# Patient Record
Sex: MALE | Race: BLACK OR AFRICAN AMERICAN | NOT HISPANIC OR LATINO | Employment: FULL TIME | URBAN - METROPOLITAN AREA
[De-identification: names, ages, dates, MRNs, and addresses within clinical notes are randomized per-mention and may not be internally consistent; named-entity substitution may affect disease eponyms.]

---

## 2018-09-09 ENCOUNTER — HOSPITAL ENCOUNTER (INPATIENT)
Facility: HOSPITAL | Age: 23
LOS: 2 days | Discharge: LEFT BEFORE DISCHARGE | DRG: 683 | End: 2018-09-11
Attending: EMERGENCY MEDICINE | Admitting: FAMILY MEDICINE
Payer: MEDICAID

## 2018-09-09 DIAGNOSIS — N17.9 ACUTE RENAL FAILURE, UNSPECIFIED ACUTE RENAL FAILURE TYPE: Primary | ICD-10-CM

## 2018-09-09 DIAGNOSIS — M62.82 NON-TRAUMATIC RHABDOMYOLYSIS: ICD-10-CM

## 2018-09-09 DIAGNOSIS — R01.1 MURMUR, CARDIAC: ICD-10-CM

## 2018-09-09 DIAGNOSIS — R11.10 VOMITING, INTRACTABILITY OF VOMITING NOT SPECIFIED, PRESENCE OF NAUSEA NOT SPECIFIED, UNSPECIFIED VOMITING TYPE: ICD-10-CM

## 2018-09-09 LAB
25(OH)D3+25(OH)D2 SERPL-MCNC: 29 NG/ML
ALBUMIN SERPL BCP-MCNC: 4.3 G/DL
ALP SERPL-CCNC: 70 U/L
ALT SERPL W/O P-5'-P-CCNC: 25 U/L
AMPHET+METHAMPHET UR QL: NEGATIVE
ANION GAP SERPL CALC-SCNC: 16 MMOL/L
AST SERPL-CCNC: 51 U/L
BACTERIA #/AREA URNS HPF: ABNORMAL /HPF
BARBITURATES UR QL SCN>200 NG/ML: NEGATIVE
BASOPHILS # BLD AUTO: 0.01 K/UL
BASOPHILS NFR BLD: 0.1 %
BENZODIAZ UR QL SCN>200 NG/ML: NEGATIVE
BILIRUB SERPL-MCNC: 1.2 MG/DL
BILIRUB UR QL STRIP: NEGATIVE
BUN SERPL-MCNC: 34 MG/DL
BZE UR QL SCN: NEGATIVE
CALCIUM SERPL-MCNC: 9.8 MG/DL
CANNABINOIDS UR QL SCN: NORMAL
CHLORIDE SERPL-SCNC: 100 MMOL/L
CK SERPL-CCNC: 2616 U/L
CLARITY UR: CLEAR
CO2 SERPL-SCNC: 24 MMOL/L
COLOR UR: YELLOW
CREAT SERPL-MCNC: 7.2 MG/DL
CREAT UR-MCNC: 170.6 MG/DL
CREAT UR-MCNC: 170.6 MG/DL
DIFFERENTIAL METHOD: ABNORMAL
EOSINOPHIL # BLD AUTO: 0 K/UL
EOSINOPHIL NFR BLD: 0.3 %
ERYTHROCYTE [DISTWIDTH] IN BLOOD BY AUTOMATED COUNT: 12.6 %
EST. GFR  (AFRICAN AMERICAN): 11 ML/MIN/1.73 M^2
EST. GFR  (NON AFRICAN AMERICAN): 10 ML/MIN/1.73 M^2
GLUCOSE SERPL-MCNC: 87 MG/DL
GLUCOSE UR QL STRIP: NEGATIVE
HCT VFR BLD AUTO: 43.9 %
HGB BLD-MCNC: 14.7 G/DL
HGB UR QL STRIP: ABNORMAL
HYALINE CASTS #/AREA URNS LPF: 0 /LPF
KETONES UR QL STRIP: NEGATIVE
LEUKOCYTE ESTERASE UR QL STRIP: ABNORMAL
LIPASE SERPL-CCNC: 48 U/L
LYMPHOCYTES # BLD AUTO: 0.9 K/UL
LYMPHOCYTES NFR BLD: 11.7 %
MCH RBC QN AUTO: 30.3 PG
MCHC RBC AUTO-ENTMCNC: 33.5 G/DL
MCV RBC AUTO: 91 FL
METHADONE UR QL SCN>300 NG/ML: NEGATIVE
MICROSCOPIC COMMENT: ABNORMAL
MONOCYTES # BLD AUTO: 1.3 K/UL
MONOCYTES NFR BLD: 17.5 %
NEUTROPHILS # BLD AUTO: 5.2 K/UL
NEUTROPHILS NFR BLD: 70.3 %
NITRITE UR QL STRIP: NEGATIVE
OPIATES UR QL SCN: NORMAL
PCP UR QL SCN>25 NG/ML: NEGATIVE
PH UR STRIP: 6 [PH] (ref 5–8)
PHOSPHATE SERPL-MCNC: 4.5 MG/DL
PLATELET # BLD AUTO: 240 K/UL
PMV BLD AUTO: 9.2 FL
POTASSIUM SERPL-SCNC: 3.4 MMOL/L
PROT SERPL-MCNC: 7.5 G/DL
PROT UR QL STRIP: ABNORMAL
PROT UR-MCNC: 125 MG/DL
PROT/CREAT UR: 0.73 MG/G{CREAT}
PTH-INTACT SERPL-MCNC: 48 PG/ML
RBC # BLD AUTO: 4.85 M/UL
RBC #/AREA URNS HPF: 5 /HPF (ref 0–4)
SODIUM SERPL-SCNC: 140 MMOL/L
SODIUM UR-SCNC: 36 MMOL/L
SP GR UR STRIP: 1.02 (ref 1–1.03)
TOXICOLOGY INFORMATION: NORMAL
URN SPEC COLLECT METH UR: ABNORMAL
UROBILINOGEN UR STRIP-ACNC: NEGATIVE EU/DL
UUN UR-MCNC: 375 MG/DL
WBC # BLD AUTO: 7.43 K/UL
WBC #/AREA URNS HPF: 10 /HPF (ref 0–5)

## 2018-09-09 PROCEDURE — 86703 HIV-1/HIV-2 1 RESULT ANTBDY: CPT

## 2018-09-09 PROCEDURE — 84156 ASSAY OF PROTEIN URINE: CPT

## 2018-09-09 PROCEDURE — 82306 VITAMIN D 25 HYDROXY: CPT

## 2018-09-09 PROCEDURE — 83690 ASSAY OF LIPASE: CPT

## 2018-09-09 PROCEDURE — 80074 ACUTE HEPATITIS PANEL: CPT

## 2018-09-09 PROCEDURE — 63600175 PHARM REV CODE 636 W HCPCS: Performed by: STUDENT IN AN ORGANIZED HEALTH CARE EDUCATION/TRAINING PROGRAM

## 2018-09-09 PROCEDURE — C9113 INJ PANTOPRAZOLE SODIUM, VIA: HCPCS | Performed by: STUDENT IN AN ORGANIZED HEALTH CARE EDUCATION/TRAINING PROGRAM

## 2018-09-09 PROCEDURE — 25000003 PHARM REV CODE 250: Performed by: STUDENT IN AN ORGANIZED HEALTH CARE EDUCATION/TRAINING PROGRAM

## 2018-09-09 PROCEDURE — 96374 THER/PROPH/DIAG INJ IV PUSH: CPT

## 2018-09-09 PROCEDURE — 84540 ASSAY OF URINE/UREA-N: CPT

## 2018-09-09 PROCEDURE — 25000003 PHARM REV CODE 250: Performed by: FAMILY MEDICINE

## 2018-09-09 PROCEDURE — 11000001 HC ACUTE MED/SURG PRIVATE ROOM

## 2018-09-09 PROCEDURE — 80053 COMPREHEN METABOLIC PANEL: CPT

## 2018-09-09 PROCEDURE — G0378 HOSPITAL OBSERVATION PER HR: HCPCS

## 2018-09-09 PROCEDURE — 96361 HYDRATE IV INFUSION ADD-ON: CPT

## 2018-09-09 PROCEDURE — 96375 TX/PRO/DX INJ NEW DRUG ADDON: CPT

## 2018-09-09 PROCEDURE — 94761 N-INVAS EAR/PLS OXIMETRY MLT: CPT

## 2018-09-09 PROCEDURE — 99285 EMERGENCY DEPT VISIT HI MDM: CPT | Mod: 25

## 2018-09-09 PROCEDURE — 85025 COMPLETE CBC W/AUTO DIFF WBC: CPT

## 2018-09-09 PROCEDURE — 86780 TREPONEMA PALLIDUM: CPT

## 2018-09-09 PROCEDURE — 80307 DRUG TEST PRSMV CHEM ANLYZR: CPT

## 2018-09-09 PROCEDURE — 83970 ASSAY OF PARATHORMONE: CPT

## 2018-09-09 PROCEDURE — 81000 URINALYSIS NONAUTO W/SCOPE: CPT | Mod: 59

## 2018-09-09 PROCEDURE — 63600175 PHARM REV CODE 636 W HCPCS: Performed by: NURSE PRACTITIONER

## 2018-09-09 PROCEDURE — 84100 ASSAY OF PHOSPHORUS: CPT

## 2018-09-09 PROCEDURE — 82550 ASSAY OF CK (CPK): CPT

## 2018-09-09 PROCEDURE — 25000003 PHARM REV CODE 250: Performed by: NURSE PRACTITIONER

## 2018-09-09 PROCEDURE — 84300 ASSAY OF URINE SODIUM: CPT

## 2018-09-09 RX ORDER — SUCRALFATE 1 G/10ML
2 SUSPENSION ORAL EVERY 6 HOURS
Status: DISCONTINUED | OUTPATIENT
Start: 2018-09-09 | End: 2018-09-09

## 2018-09-09 RX ORDER — MORPHINE SULFATE 4 MG/ML
4 INJECTION, SOLUTION INTRAMUSCULAR; INTRAVENOUS
Status: COMPLETED | OUTPATIENT
Start: 2018-09-09 | End: 2018-09-09

## 2018-09-09 RX ORDER — SODIUM CHLORIDE 0.9 % (FLUSH) 0.9 %
5 SYRINGE (ML) INJECTION
Status: DISCONTINUED | OUTPATIENT
Start: 2018-09-09 | End: 2018-09-11 | Stop reason: HOSPADM

## 2018-09-09 RX ORDER — IBUPROFEN 200 MG
24 TABLET ORAL
Status: DISCONTINUED | OUTPATIENT
Start: 2018-09-09 | End: 2018-09-11 | Stop reason: HOSPADM

## 2018-09-09 RX ORDER — ONDANSETRON 2 MG/ML
4 INJECTION INTRAMUSCULAR; INTRAVENOUS EVERY 8 HOURS PRN
Status: DISCONTINUED | OUTPATIENT
Start: 2018-09-09 | End: 2018-09-11 | Stop reason: HOSPADM

## 2018-09-09 RX ORDER — MORPHINE SULFATE 2 MG/ML
2 INJECTION, SOLUTION INTRAMUSCULAR; INTRAVENOUS ONCE
Status: COMPLETED | OUTPATIENT
Start: 2018-09-09 | End: 2018-09-09

## 2018-09-09 RX ORDER — PANTOPRAZOLE SODIUM 40 MG/10ML
40 INJECTION, POWDER, LYOPHILIZED, FOR SOLUTION INTRAVENOUS DAILY
Status: DISCONTINUED | OUTPATIENT
Start: 2018-09-09 | End: 2018-09-11 | Stop reason: HOSPADM

## 2018-09-09 RX ORDER — CALCIUM CARBONATE 200(500)MG
500 TABLET,CHEWABLE ORAL DAILY PRN
Status: DISCONTINUED | OUTPATIENT
Start: 2018-09-09 | End: 2018-09-11 | Stop reason: HOSPADM

## 2018-09-09 RX ORDER — IBUPROFEN 200 MG
16 TABLET ORAL
Status: DISCONTINUED | OUTPATIENT
Start: 2018-09-09 | End: 2018-09-11 | Stop reason: HOSPADM

## 2018-09-09 RX ORDER — RAMELTEON 8 MG/1
8 TABLET ORAL NIGHTLY PRN
Status: DISCONTINUED | OUTPATIENT
Start: 2018-09-09 | End: 2018-09-11 | Stop reason: HOSPADM

## 2018-09-09 RX ORDER — ONDANSETRON 2 MG/ML
4 INJECTION INTRAMUSCULAR; INTRAVENOUS
Status: COMPLETED | OUTPATIENT
Start: 2018-09-09 | End: 2018-09-09

## 2018-09-09 RX ORDER — SUCRALFATE 1 G/10ML
1 SUSPENSION ORAL EVERY 6 HOURS
Status: DISCONTINUED | OUTPATIENT
Start: 2018-09-09 | End: 2018-09-11 | Stop reason: HOSPADM

## 2018-09-09 RX ORDER — SODIUM CHLORIDE, SODIUM LACTATE, POTASSIUM CHLORIDE, CALCIUM CHLORIDE 600; 310; 30; 20 MG/100ML; MG/100ML; MG/100ML; MG/100ML
INJECTION, SOLUTION INTRAVENOUS CONTINUOUS
Status: DISCONTINUED | OUTPATIENT
Start: 2018-09-10 | End: 2018-09-10

## 2018-09-09 RX ORDER — GLUCAGON 1 MG
1 KIT INJECTION
Status: DISCONTINUED | OUTPATIENT
Start: 2018-09-09 | End: 2018-09-11 | Stop reason: HOSPADM

## 2018-09-09 RX ORDER — FAMOTIDINE 20 MG/1
20 TABLET, FILM COATED ORAL 2 TIMES DAILY
Status: DISCONTINUED | OUTPATIENT
Start: 2018-09-09 | End: 2018-09-11 | Stop reason: HOSPADM

## 2018-09-09 RX ORDER — ACETAMINOPHEN 325 MG/1
650 TABLET ORAL EVERY 4 HOURS PRN
Status: DISCONTINUED | OUTPATIENT
Start: 2018-09-09 | End: 2018-09-11 | Stop reason: HOSPADM

## 2018-09-09 RX ADMIN — SODIUM CHLORIDE, SODIUM LACTATE, POTASSIUM CHLORIDE, AND CALCIUM CHLORIDE 1000 ML: .6; .31; .03; .02 INJECTION, SOLUTION INTRAVENOUS at 05:09

## 2018-09-09 RX ADMIN — LIDOCAINE HYDROCHLORIDE: 20 SOLUTION ORAL; TOPICAL at 04:09

## 2018-09-09 RX ADMIN — ONDANSETRON 4 MG: 2 INJECTION INTRAMUSCULAR; INTRAVENOUS at 03:09

## 2018-09-09 RX ADMIN — RAMELTEON 8 MG: 8 TABLET, FILM COATED ORAL at 11:09

## 2018-09-09 RX ADMIN — SODIUM CHLORIDE 1000 ML: 0.9 INJECTION, SOLUTION INTRAVENOUS at 03:09

## 2018-09-09 RX ADMIN — SODIUM CHLORIDE, SODIUM LACTATE, POTASSIUM CHLORIDE, AND CALCIUM CHLORIDE 1000 ML: .6; .31; .03; .02 INJECTION, SOLUTION INTRAVENOUS at 09:09

## 2018-09-09 RX ADMIN — MORPHINE SULFATE 2 MG: 2 INJECTION, SOLUTION INTRAMUSCULAR; INTRAVENOUS at 11:09

## 2018-09-09 RX ADMIN — PANTOPRAZOLE SODIUM 40 MG: 40 INJECTION, POWDER, FOR SOLUTION INTRAVENOUS at 11:09

## 2018-09-09 RX ADMIN — MORPHINE SULFATE 4 MG: 4 INJECTION, SOLUTION INTRAMUSCULAR; INTRAVENOUS at 04:09

## 2018-09-09 RX ADMIN — FAMOTIDINE 20 MG: 20 TABLET, FILM COATED ORAL at 09:09

## 2018-09-09 RX ADMIN — ACETAMINOPHEN 650 MG: 325 TABLET ORAL at 08:09

## 2018-09-09 RX ADMIN — SUCRALFATE 1 G: 1 SUSPENSION ORAL at 11:09

## 2018-09-09 NOTE — H&P
[facility] Medicine  History & Physical    Patient Name: Alka Rowe  MRN: [id number]  Admission Date: 9/9/2018  Attending Physician: Ericka Cosme MD   Primary Care Provider: Primary Doctor No         Patient information was obtained from patient.     Subjective:     Principal Problem:Acute renal failure    Chief Complaint:   Chief Complaint   Patient presents with    Emesis     vomiting since yesterday.          HPI: 21 yo male with no med history presenting with a history of nausea, vomiting starting Friday night 10 minutes after eating white beans and rice from a questionable source. Patient states that he has multiple episodes of emesis and has not tolerated any PO food or liquid intake. Patient has tried to drink orange juice, cranberry juice and water. Patient also report decrease urine output since Friday. Patient has achy abdominal pain. Patient denies any fever, constipation, diarrhea, chest pain, sob. Patient denies sick contacts or knows anyone else who ate the white beans and rice. Patient reports marijuana use with his last use on Thursday but denies alcohol and illicit drug use. Patient used 3 doses of Aleve for a headache.     History reviewed. No pertinent past medical history.    Past Surgical History:   Procedure Laterality Date    HAND SURGERY         Review of patient's allergies indicates:  No Known Allergies    No current facility-administered medications on file prior to encounter.      Current Outpatient Medications on File Prior to Encounter   Medication Sig    [DISCONTINUED] ondansetron (ZOFRAN-ODT) 4 MG TbDL Take 1 tablet (4 mg total) by mouth every 8 (eight) hours as needed.     Family History     None        Tobacco Use    Smoking status: Never Smoker    Smokeless tobacco: Never Used   Substance and Sexual Activity    Alcohol use: No    Drug use: No    Sexual activity: Yes     Partners: Female     Birth control/protection: None     Review of  Systems   Constitutional: Positive for activity change. Negative for fever and unexpected weight change.   Respiratory: Negative for cough, choking and shortness of breath.    Cardiovascular: Negative for chest pain, palpitations and leg swelling.   Gastrointestinal: Positive for abdominal pain. Negative for blood in stool, constipation, diarrhea, nausea and vomiting.   Endocrine: Negative for polyphagia and polyuria.   Genitourinary: Positive for decreased urine volume. Negative for dysuria, flank pain, frequency and hematuria.   Musculoskeletal: Negative for myalgias.   Skin: Negative for rash and wound.   Neurological: Negative for weakness and light-headedness.     Objective:     Vital Signs (Most Recent):  Temp: 98.3 °F (36.8 °C) (09/09/18 1455)  Pulse: (!) 58 (09/09/18 1743)  Resp: 16 (09/09/18 1743)  BP: 139/86 (09/09/18 1743)  SpO2: 100 % (09/09/18 1743) Vital Signs (24h Range):  Temp:  [98.3 °F (36.8 °C)] 98.3 °F (36.8 °C)  Pulse:  [58-83] 58  Resp:  [16] 16  SpO2:  [100 %] 100 %  BP: (139-148)/(86-95) 139/86     Weight: 55.8 kg (123 lb)  Body mass index is 19.26 kg/m².    Physical Exam   Constitutional: He is oriented to person, place, and time. He appears well-developed and well-nourished. He appears distressed.   HENT:   Head: Normocephalic. Hematoma on the forehead  Mouth/Throat: Mucous membranes are dry.   Neck: Normal range of motion. Neck supple. No JVD present.   Cardiovascular: Normal rate, regular rhythm, normal heart sounds and intact distal pulses. Exam reveals no gallop and no friction rub.   No murmur heard.  Pulmonary/Chest: Effort normal and breath sounds normal. No respiratory distress. He has no wheezes.   Abdominal: Soft. Bowel sounds are normal. He exhibits no distension and no mass. There is tenderness. There is no rebound.   Tenderness to palpation on the right upper abdomen.   Negative Langley's sign   Musculoskeletal: Normal range of motion. He exhibits no edema or tenderness.    Neurological: He is alert and oriented to person, place, and time. No cranial nerve deficit. He exhibits normal muscle tone.   Skin: Skin is warm and dry. Capillary refill takes less than 2 seconds. No rash noted. He is not diaphoretic. No erythema.   Piloerection on bilateral legs   Psychiatric: His affect is labile and inappropriate.           Significant Labs:   CBC:   Recent Labs   Lab  09/09/18   1532   WBC  7.43   HGB  14.7   HCT  43.9   PLT  240     CMP:   Recent Labs   Lab  09/09/18   1532   NA  140   K  3.4*   CL  100   CO2  24   GLU  87   BUN  34*   CREATININE  7.2*   CALCIUM  9.8   PROT  7.5   ALBUMIN  4.3   BILITOT  1.2*   ALKPHOS  70   AST  51*   ALT  25   ANIONGAP  16   EGFRNONAA  10*       Significant Imaging: I have reviewed all pertinent imaging results/findings within the past 24 hours.           Assessment/Plan:     * Acute renal failure    21 yo male with no past med history presenting for 2 days of nausea and vomiting after food ingestions found to have an elevated creatine of 7.2. DDx includes food-borne illness, viral gastroenteritis, drug ingestion, rhabomyolysis.  - Patient has multiple episodes of emesis and poor PO fluid and solid intake  - Will give LR bolus and recheck CMP in the AM  - F/U Utox, Urine Na, Urine Cr  - F/U renal labs, HIV, Syphilis, Hepatitis, CPK, CBC, CMP   - Strict I&O's          VTE Risk Mitigation (From admission, onward)    Michelle Lam MD  Department of Hospital Medicine   [facility]

## 2018-09-09 NOTE — ED PROVIDER NOTES
Encounter Date: 9/9/2018       History     Chief Complaint   Patient presents with    Emesis     vomiting since yesterday.       Patient 22-year-old male who denies past medical history who presents to ED with emesis since yesterday.  Patient reports that the vomiting started approximately 10 min after eating white beans, sausage, and rice yesterday.  Patient reports that he woke up this morning and continued to vomit.  Denies any sick contacts.  Patient associates generalized abdominal pain. Last BM today, no diarrhea.  Denies any exacerbating or alleviating factors.  Denies fever, chills, neck pain/stiffness, dizziness, lightheadedness, SOB, chest pain, back pain, and dysuria.  Patient denies any complaints at this time.      The history is provided by the patient.     Review of patient's allergies indicates:  No Known Allergies  History reviewed. No pertinent past medical history.  Past Surgical History:   Procedure Laterality Date    HAND SURGERY       History reviewed. No pertinent family history.  Social History     Tobacco Use    Smoking status: Never Smoker    Smokeless tobacco: Never Used   Substance Use Topics    Alcohol use: No    Drug use: No     Review of Systems   Constitutional: Negative for fever.   Gastrointestinal: Positive for abdominal pain and vomiting (multiple times). Negative for abdominal distention, blood in stool, constipation, diarrhea and nausea.   Genitourinary: Negative for decreased urine volume, difficulty urinating, dysuria, flank pain, frequency and urgency.   Musculoskeletal: Negative for back pain, neck pain and neck stiffness.   Skin: Negative for rash.   All other systems reviewed and are negative.      Physical Exam     Initial Vitals [09/09/18 1455]   BP Pulse Resp Temp SpO2   (!) 148/95 83 16 98.3 °F (36.8 °C) 100 %      MAP       --         Physical Exam    Vitals reviewed.  Constitutional: He appears well-developed and well-nourished. He is not diaphoretic. He is  active and cooperative.  Non-toxic appearance. He does not have a sickly appearance.   No acute distress.   HENT:   Head: Atraumatic.   Mouth/Throat: Uvula is midline, oropharynx is clear and moist and mucous membranes are normal.   Eyes: EOM are normal.   Neck: Trachea normal, normal range of motion, full passive range of motion without pain and phonation normal. Neck supple.   Cardiovascular: Regular rhythm and normal pulses.   Pulmonary/Chest: No respiratory distress.   Abdominal: There is generalized tenderness.   Abdomen soft.  No guarding, rigidity, or rebound.  No CVA tenderness.  Negative peritoneal signs.   Neurological: He is alert and oriented to person, place, and time. Gait normal.   Skin: Skin is warm, dry and intact.   Psychiatric: He has a normal mood and affect.         ED Course   Procedures  Labs Reviewed   CBC W/ AUTO DIFFERENTIAL - Abnormal; Notable for the following components:       Result Value    Lymph # 0.9 (*)     Mono # 1.3 (*)     Lymph% 11.7 (*)     Mono% 17.5 (*)     All other components within normal limits   COMPREHENSIVE METABOLIC PANEL - Abnormal; Notable for the following components:    Potassium 3.4 (*)     BUN, Bld 34 (*)     Creatinine 7.2 (*)     Total Bilirubin 1.2 (*)     AST 51 (*)     eGFR if  11 (*)     eGFR if non  10 (*)     All other components within normal limits   LIPASE          Imaging Results          CT Renal Stone Study ABD Pelvis WO (Final result)  Result time 09/09/18 16:42:47    Final result by Meghan Dias MD (09/09/18 16:42:47)                 Impression:      No evidence of nephrolithiasis or obstructive uropathy.      Electronically signed by: Meghan Dias MD  Date:    09/09/2018  Time:    16:42             Narrative:    EXAMINATION:  CT RENAL STONE STUDY ABD PELVIS WO    CLINICAL HISTORY:  Abdominal pain, unspecified;    TECHNIQUE:  Low dose axial images, sagittal and coronal reformations were obtained from  "the lung bases to the pubic symphysis.  Contrast was not administered.    COMPARISON:  None.    FINDINGS:  The lung bases are clear.    The liver is normal in size and contour.  Evaluation for focal lesions is limited by lack of intravenous contrast.  The gallbladder is nondistended.  The pancreas, spleen, and adrenal glands have a normal noncontrast appearance.  Kidneys demonstrate normal cortical thickness with no evidence of hydronephrosis or nephrolithiasis.    Stomach and small bowel are nondistended.  There is no evidence of colonic obstruction.    Bladder and reproductive organs have a normal appearance.    There is no evidence of free air or free fluid in the abdomen or pelvis.    No osseous abnormalities are seen.                                 Medical Decision Making:   Clinical Tests:   Lab Tests: Ordered and Reviewed  Radiological Study: Ordered and Reviewed  ED Management:    1622- Pt reassessed. Reports "10/10" pain, 4 mg IV morphine and GI cocktail ordered.     1703- PO fluid challenge given.    1709- [facility]  medicine consulted for admission.     1712- Pt will be admitted to [facility] for further evaluation of acute renal failure and emesis.                       Clinical Impression:   The primary encounter diagnosis was Acute renal failure, unspecified acute renal failure type. A diagnosis of Vomiting, intractability of vomiting not specified, presence of nausea not specified, unspecified vomiting type was also pertinent to this visit.                             Dm Barbosa, FRANKY  09/09/18 1713    "

## 2018-09-09 NOTE — SUBJECTIVE & OBJECTIVE
History reviewed. No pertinent past medical history.    Past Surgical History:   Procedure Laterality Date    HAND SURGERY         Review of patient's allergies indicates:  No Known Allergies    No current facility-administered medications on file prior to encounter.      Current Outpatient Medications on File Prior to Encounter   Medication Sig    [DISCONTINUED] ondansetron (ZOFRAN-ODT) 4 MG TbDL Take 1 tablet (4 mg total) by mouth every 8 (eight) hours as needed.     Family History     None        Tobacco Use    Smoking status: Never Smoker    Smokeless tobacco: Never Used   Substance and Sexual Activity    Alcohol use: No    Drug use: No    Sexual activity: Yes     Partners: Female     Birth control/protection: None     Review of Systems   Constitutional: Positive for activity change. Negative for fever and unexpected weight change.   Respiratory: Negative for cough, choking and shortness of breath.    Cardiovascular: Negative for chest pain, palpitations and leg swelling.   Gastrointestinal: Positive for abdominal pain. Negative for blood in stool, constipation, diarrhea, nausea and vomiting.   Endocrine: Negative for polyphagia and polyuria.   Genitourinary: Positive for decreased urine volume. Negative for dysuria, flank pain, frequency and hematuria.   Musculoskeletal: Negative for myalgias.   Skin: Negative for rash and wound.   Neurological: Negative for weakness and light-headedness.     Objective:     Vital Signs (Most Recent):  Temp: 98.3 °F (36.8 °C) (09/09/18 1455)  Pulse: (!) 58 (09/09/18 1743)  Resp: 16 (09/09/18 1743)  BP: 139/86 (09/09/18 1743)  SpO2: 100 % (09/09/18 1743) Vital Signs (24h Range):  Temp:  [98.3 °F (36.8 °C)] 98.3 °F (36.8 °C)  Pulse:  [58-83] 58  Resp:  [16] 16  SpO2:  [100 %] 100 %  BP: (139-148)/(86-95) 139/86     Weight: 55.8 kg (123 lb)  Body mass index is 19.26 kg/m².    Physical Exam   Constitutional: He is oriented to person, place, and time. He appears well-developed  and well-nourished. He appears distressed.   HENT:   Head: Normocephalic and atraumatic.   Mouth/Throat: Mucous membranes are dry.   Neck: Normal range of motion. Neck supple. No JVD present.   Cardiovascular: Normal rate, regular rhythm, normal heart sounds and intact distal pulses. Exam reveals no gallop and no friction rub.   No murmur heard.  Pulmonary/Chest: Effort normal and breath sounds normal. No respiratory distress. He has no wheezes.   Abdominal: Soft. Bowel sounds are normal. He exhibits no distension and no mass. There is tenderness. There is no rebound.   Tenderness to palpation on the right upper abdomen.   Negative Langley's sign   Musculoskeletal: Normal range of motion. He exhibits no edema or tenderness.   Neurological: He is alert and oriented to person, place, and time. No cranial nerve deficit. He exhibits normal muscle tone.   Skin: Skin is warm and dry. Capillary refill takes less than 2 seconds. No rash noted. He is not diaphoretic. No erythema.   Piloerection on bilateral legs   Psychiatric: His affect is labile and inappropriate.           Significant Labs:   CBC:   Recent Labs   Lab  09/09/18   1532   WBC  7.43   HGB  14.7   HCT  43.9   PLT  240     CMP:   Recent Labs   Lab  09/09/18   1532   NA  140   K  3.4*   CL  100   CO2  24   GLU  87   BUN  34*   CREATININE  7.2*   CALCIUM  9.8   PROT  7.5   ALBUMIN  4.3   BILITOT  1.2*   ALKPHOS  70   AST  51*   ALT  25   ANIONGAP  16   EGFRNONAA  10*       Significant Imaging: I have reviewed all pertinent imaging results/findings within the past 24 hours.

## 2018-09-09 NOTE — ED NOTES
Nausea/vomiting that began 1 day ago after eating white beans, sausage and white rice.  Vomited approx 10 min after eating.  Denies being around anyone sick.  Reports generalized abdominal pain.  Reports LBM today, no diarrhea.  Denies dysuria or hematuria.

## 2018-09-09 NOTE — ED NOTES
APPEARANCE: Alert, oriented and in no acute distress.  CARDIAC: Normal rate and rhythm.   PERIPHERAL VASCULAR: peripheral pulses present. Normal cap refill. No edema. Warm to touch.    RESPIRATORY:Normal rate and effort. Respirations are equal and unlabored no obvious signs of distress.  MUSC: Full ROM. No bony tenderness or soft tissue tenderness. No obvious deformity.  SKIN: Skin is warm and dry, normal skin turgor, mucous membranes moist.  NEURO: 5/5 strength major flexors/extensors bilaterally. Sensory intact to light touch bilaterally. Brenna coma scale: eyes open spontaneously-4, oriented & converses-5, obeys commands-6. No neurological abnormalities.   MENTAL STATUS: awake, alert and aware of environment.  EYE: PERRL, both eyes: pupils brisk and reactive to light. Normal size.  ENT: EARS: no obvious drainage. NOSE: no active bleeding.

## 2018-09-09 NOTE — HPI
23 yo male with no med history presenting with a history of nausea, vomiting starting Friday night 10 minutes after eating white beans and rice from a questionable source. Patient states that he has multiple episodes of emesis and has not tolerated any PO food or liquid intake. Patient has tried to drink orange juice, cranberry juice and water. Patient also report decrease urine output since Friday. Patient has achy abdominal pain. Patient denies any fever, constipation, diarrhea, chest pain, sob. Patient denies sick contacts or knows anyone else who ate the white beans and rice. Patient reports marijuana use with his last use on Thursday but denies alcohol and illicit drug use. Patient used 3 doses of Aleve for a headache.

## 2018-09-09 NOTE — ASSESSMENT & PLAN NOTE
23 yo male with no past med history presenting for 2 days of nausea and vomiting after food ingestions found to have an elevated creatine of 7.2. DDx includes food-borne illness, viral gastroenteritis, drug ingestion, rhabomyolysis.  - Patient has multiple episodes of emesis and poor PO fluid and solid intake  - Will give LR bolus and recheck CMP in the AM  - F/U Utox, Urine Na, Urine Cr  - F/U renal labs, HIV, Syphilis, Hepatitis, CPK, CBC, CMP   - Strict I&O's

## 2018-09-10 LAB
ALBUMIN SERPL BCP-MCNC: 3.2 G/DL
ALP SERPL-CCNC: 51 U/L
ALT SERPL W/O P-5'-P-CCNC: 20 U/L
ANION GAP SERPL CALC-SCNC: 10 MMOL/L
ANION GAP SERPL CALC-SCNC: 12 MMOL/L
AST SERPL-CCNC: 41 U/L
BASOPHILS # BLD AUTO: 0.01 K/UL
BASOPHILS NFR BLD: 0.2 %
BILIRUB SERPL-MCNC: 1.2 MG/DL
BUN SERPL-MCNC: 35 MG/DL
BUN SERPL-MCNC: 39 MG/DL
CALCIUM SERPL-MCNC: 8.7 MG/DL
CALCIUM SERPL-MCNC: 8.9 MG/DL
CHLORIDE SERPL-SCNC: 103 MMOL/L
CHLORIDE SERPL-SCNC: 103 MMOL/L
CK SERPL-CCNC: 2238 U/L
CO2 SERPL-SCNC: 21 MMOL/L
CO2 SERPL-SCNC: 24 MMOL/L
CREAT SERPL-MCNC: 6.6 MG/DL
CREAT SERPL-MCNC: 6.8 MG/DL
DIFFERENTIAL METHOD: ABNORMAL
EOSINOPHIL # BLD AUTO: 0 K/UL
EOSINOPHIL NFR BLD: 0.5 %
ERYTHROCYTE [DISTWIDTH] IN BLOOD BY AUTOMATED COUNT: 12.5 %
EST. GFR  (AFRICAN AMERICAN): 12 ML/MIN/1.73 M^2
EST. GFR  (AFRICAN AMERICAN): 13 ML/MIN/1.73 M^2
EST. GFR  (NON AFRICAN AMERICAN): 10 ML/MIN/1.73 M^2
EST. GFR  (NON AFRICAN AMERICAN): 11 ML/MIN/1.73 M^2
ESTIMATED AVG GLUCOSE: 100 MG/DL
GLUCOSE SERPL-MCNC: 102 MG/DL
GLUCOSE SERPL-MCNC: 73 MG/DL
HAV IGM SERPL QL IA: NEGATIVE
HBA1C MFR BLD HPLC: 5.1 %
HBV CORE IGM SERPL QL IA: NEGATIVE
HBV SURFACE AG SERPL QL IA: NEGATIVE
HCT VFR BLD AUTO: 39.6 %
HCV AB SERPL QL IA: NEGATIVE
HGB BLD-MCNC: 13.7 G/DL
HIV 1+2 AB+HIV1 P24 AG SERPL QL IA: NEGATIVE
LYMPHOCYTES # BLD AUTO: 1.2 K/UL
LYMPHOCYTES NFR BLD: 20.3 %
MAGNESIUM SERPL-MCNC: 2.4 MG/DL
MCH RBC QN AUTO: 31.2 PG
MCHC RBC AUTO-ENTMCNC: 34.6 G/DL
MCV RBC AUTO: 90 FL
MONOCYTES # BLD AUTO: 1.4 K/UL
MONOCYTES NFR BLD: 23 %
NEUTROPHILS # BLD AUTO: 3.4 K/UL
NEUTROPHILS NFR BLD: 55.8 %
PHOSPHATE SERPL-MCNC: 4.6 MG/DL
PLATELET # BLD AUTO: 227 K/UL
PMV BLD AUTO: 9.7 FL
POTASSIUM SERPL-SCNC: 4 MMOL/L
POTASSIUM SERPL-SCNC: 4 MMOL/L
PROT SERPL-MCNC: 5.6 G/DL
RBC # BLD AUTO: 4.39 M/UL
SODIUM SERPL-SCNC: 136 MMOL/L
SODIUM SERPL-SCNC: 137 MMOL/L
WBC # BLD AUTO: 6.12 K/UL

## 2018-09-10 PROCEDURE — 94761 N-INVAS EAR/PLS OXIMETRY MLT: CPT

## 2018-09-10 PROCEDURE — 36415 COLL VENOUS BLD VENIPUNCTURE: CPT

## 2018-09-10 PROCEDURE — C9113 INJ PANTOPRAZOLE SODIUM, VIA: HCPCS | Performed by: STUDENT IN AN ORGANIZED HEALTH CARE EDUCATION/TRAINING PROGRAM

## 2018-09-10 PROCEDURE — 11000001 HC ACUTE MED/SURG PRIVATE ROOM

## 2018-09-10 PROCEDURE — 84100 ASSAY OF PHOSPHORUS: CPT

## 2018-09-10 PROCEDURE — 25000003 PHARM REV CODE 250: Performed by: FAMILY MEDICINE

## 2018-09-10 PROCEDURE — 63600175 PHARM REV CODE 636 W HCPCS: Performed by: STUDENT IN AN ORGANIZED HEALTH CARE EDUCATION/TRAINING PROGRAM

## 2018-09-10 PROCEDURE — 25000003 PHARM REV CODE 250: Performed by: STUDENT IN AN ORGANIZED HEALTH CARE EDUCATION/TRAINING PROGRAM

## 2018-09-10 PROCEDURE — 93306 TTE W/DOPPLER COMPLETE: CPT

## 2018-09-10 PROCEDURE — 85025 COMPLETE CBC W/AUTO DIFF WBC: CPT

## 2018-09-10 PROCEDURE — 80053 COMPREHEN METABOLIC PANEL: CPT

## 2018-09-10 PROCEDURE — 82271 OCCULT BLOOD OTHER SOURCES: CPT

## 2018-09-10 PROCEDURE — 80048 BASIC METABOLIC PNL TOTAL CA: CPT

## 2018-09-10 PROCEDURE — 83036 HEMOGLOBIN GLYCOSYLATED A1C: CPT

## 2018-09-10 PROCEDURE — 82550 ASSAY OF CK (CPK): CPT

## 2018-09-10 PROCEDURE — 83874 ASSAY OF MYOGLOBIN: CPT

## 2018-09-10 PROCEDURE — 83735 ASSAY OF MAGNESIUM: CPT

## 2018-09-10 RX ORDER — SODIUM CHLORIDE 9 MG/ML
INJECTION, SOLUTION INTRAVENOUS CONTINUOUS
Status: DISCONTINUED | OUTPATIENT
Start: 2018-09-10 | End: 2018-09-11

## 2018-09-10 RX ORDER — HYDROXYZINE PAMOATE 25 MG/1
50 CAPSULE ORAL NIGHTLY PRN
Status: DISCONTINUED | OUTPATIENT
Start: 2018-09-10 | End: 2018-09-10

## 2018-09-10 RX ORDER — SODIUM CHLORIDE 9 MG/ML
INJECTION, SOLUTION INTRAVENOUS CONTINUOUS
Status: DISCONTINUED | OUTPATIENT
Start: 2018-09-10 | End: 2018-09-10

## 2018-09-10 RX ORDER — HYDROXYZINE PAMOATE 25 MG/1
25 CAPSULE ORAL NIGHTLY PRN
Status: DISCONTINUED | OUTPATIENT
Start: 2018-09-10 | End: 2018-09-11 | Stop reason: HOSPADM

## 2018-09-10 RX ADMIN — SUCRALFATE 1 G: 1 SUSPENSION ORAL at 06:09

## 2018-09-10 RX ADMIN — SODIUM CHLORIDE: 0.9 INJECTION, SOLUTION INTRAVENOUS at 12:09

## 2018-09-10 RX ADMIN — PANTOPRAZOLE SODIUM 40 MG: 40 INJECTION, POWDER, FOR SOLUTION INTRAVENOUS at 09:09

## 2018-09-10 RX ADMIN — SODIUM CHLORIDE: 0.9 INJECTION, SOLUTION INTRAVENOUS at 11:09

## 2018-09-10 RX ADMIN — PROMETHAZINE HYDROCHLORIDE 6.25 MG: 25 INJECTION INTRAMUSCULAR; INTRAVENOUS at 11:09

## 2018-09-10 RX ADMIN — SODIUM CHLORIDE, SODIUM LACTATE, POTASSIUM CHLORIDE, AND CALCIUM CHLORIDE: .6; .31; .03; .02 INJECTION, SOLUTION INTRAVENOUS at 12:09

## 2018-09-10 RX ADMIN — ONDANSETRON 4 MG: 2 INJECTION INTRAMUSCULAR; INTRAVENOUS at 09:09

## 2018-09-10 RX ADMIN — RAMELTEON 8 MG: 8 TABLET, FILM COATED ORAL at 08:09

## 2018-09-10 RX ADMIN — FAMOTIDINE 20 MG: 20 TABLET, FILM COATED ORAL at 08:09

## 2018-09-10 RX ADMIN — SODIUM CHLORIDE, SODIUM LACTATE, POTASSIUM CHLORIDE, AND CALCIUM CHLORIDE: .6; .31; .03; .02 INJECTION, SOLUTION INTRAVENOUS at 05:09

## 2018-09-10 RX ADMIN — ONDANSETRON 4 MG: 2 INJECTION INTRAMUSCULAR; INTRAVENOUS at 06:09

## 2018-09-10 RX ADMIN — SUCRALFATE 1 G: 1 SUSPENSION ORAL at 05:09

## 2018-09-10 NOTE — PROGRESS NOTES
"PGY-1 Progress Note [facility]    Follow up for:   Chief Complaint   Patient presents with    Emesis     vomiting since yesterday.         Hospital Stay Day 0    Subjective: AF VSS, Patient continues to have nausea and vomiting. Patient refused Zofran. Patient is still NPO. Patient forced himself to vomit because the medications (sucralfate) he was taking did not taste good. Patient is asking for a diet. Patient is passing flatus. Patient continues to have abdominal pain    Scheduled Meds:   famotidine  20 mg Oral BID    pantoprazole  40 mg Intravenous Daily    sucralfate  1 g Oral Q6H     Continuous Infusions:   lactated ringers 150 mL/hr at 09/10/18 0524     PRN Meds:acetaminophen, calcium carbonate, dextrose 50%, dextrose 50%, glucagon (human recombinant), glucose, glucose, ondansetron, ramelteon, sodium chloride 0.9%    Review of patient's allergies indicates:  No Known Allergies    Objectives:     Vitals(Most Recent)      BP  Min: 139/86  Max: 148/95  Temp  Av.2 °F (36.8 °C)  Min: 97.9 °F (36.6 °C)  Max: 98.4 °F (36.9 °C)  Pulse  Av  Min: 50  Max: 83  Resp  Av  Min: 16  Max: 18  SpO2  Av %  Min: 98 %  Max: 100 %  Height  Av' 7" (170.2 cm)  Min: 5' 7" (170.2 cm)  Max: 5' 7" (170.2 cm)  Weight  Av.5 kg (124 lb 10.6 oz)  Min: 55.8 kg (123 lb)  Max: 57.3 kg (126 lb 5.2 oz)             Vitals(Shcl14e)  Temp:  [97.9 °F (36.6 °C)-98.4 °F (36.9 °C)]   Pulse:  [50-83]   Resp:  [16-18]   BP: (139-148)/(82-97)   SpO2:  [98 %-100 %]     I & O([id number])    Intake/Output Summary (Last 24 hours) at 9/10/2018 0917  Last data filed at 9/10/2018 0600  Gross per 24 hour   Intake 2900 ml   Output 325 ml   Net 2575 ml     Urinalysis  Recent Labs   Lab  18   1710   COLORU  Yellow   SPECGRAV  1.020   PHUR  6.0   PROTEINUA  2+*   BACTERIA  Rare   NITRITE  Negative   LEUKOCYTESUR  1+*   UROBILINOGEN  Negative   HYALINECASTS  0       General: AAOx3. NAD  HEENT: NCAT. PERRLA. EOMI.   Neck: Supple. No " JVD. No LAD.    CV: RRR. NL S1/S2. Systolic murmur present  Chest: NL effort. CTAB. No R/R/W.   Abd: +BS x 4. Soft. ND. Mildlly tender with palpation in the RUQ. Negative Langley's sign. No rebound or guarding.   Ext: No C/C/E. Peripheral pulses intact. NL ROM. Muscle twitching  Skin: Intact. No rash. Multiple tattoos. Piloerection  Neuro: CN II-XII intact. No focal deficit. Strength 5/5 throughout. Sensation intact.   Psych: Very emotionally labile. Inappropriate affect.     LABS  CBC  Recent Labs   Lab  09/09/18   1532  09/10/18   0428   WBC  7.43  6.12   RBC  4.85  4.39*   HGB  14.7  13.7*   HCT  43.9  39.6*   PLT  240  227   MCV  91  90   MCH  30.3  31.2*   MCHC  33.5  34.6     BMP  Recent Labs   Lab  09/09/18   1532  09/10/18   0427   NA  140  136   K  3.4*  4.0   CO2  24  21*   CL  100  103   BUN  34*  35*   CREATININE  7.2*  6.8*   GLU  87  73       Recent Labs   Lab  09/09/18   1532  09/10/18   0427   CALCIUM  9.8  8.7   MG   --   2.4   PHOS  4.5  4.6*     LFT  Recent Labs   Lab  09/09/18   1532  09/10/18   0427   PROT  7.5  5.6*   ALBUMIN  4.3  3.2*   BILITOT  1.2*  1.2*   AST  51*  41*   ALKPHOS  70  51*   ALT  25  20       Recent Labs   Lab  09/09/18   1710   COLORU  Yellow   SPECGRAV  1.020   PHUR  6.0   PROTEINUA  2+*   BACTERIA  Rare     LAST HbA1c  Lab Results   Component Value Date    HGBA1C 5.1 09/10/2018           Imaging  Imaging Results          CT Renal Stone Study ABD Pelvis WO (Final result)  Result time 09/09/18 16:42:47    Final result by Meghan Dias MD (09/09/18 16:42:47)                 Impression:      No evidence of nephrolithiasis or obstructive uropathy.      Electronically signed by: Meghan Dias MD  Date:    09/09/2018  Time:    16:42             Narrative:    EXAMINATION:  CT RENAL STONE STUDY ABD PELVIS WO    CLINICAL HISTORY:  Abdominal pain, unspecified;    TECHNIQUE:  Low dose axial images, sagittal and coronal reformations were obtained from the lung bases to the  pubic symphysis.  Contrast was not administered.    COMPARISON:  None.    FINDINGS:  The lung bases are clear.    The liver is normal in size and contour.  Evaluation for focal lesions is limited by lack of intravenous contrast.  The gallbladder is nondistended.  The pancreas, spleen, and adrenal glands have a normal noncontrast appearance.  Kidneys demonstrate normal cortical thickness with no evidence of hydronephrosis or nephrolithiasis.    Stomach and small bowel are nondistended.  There is no evidence of colonic obstruction.    Bladder and reproductive organs have a normal appearance.    There is no evidence of free air or free fluid in the abdomen or pelvis.    No osseous abnormalities are seen.                                Assessment/Plan: 23 yo male with no past med history presenting for 2 days of nausea and vomiting after food ingestions found to have an elevated creatine of 7.2. DDx includes food-borne illness, viral gastroenteritis, drug ingestion, rhabomyolysis.  * Acute renal failure     - Patient has multiple episodes of emesis and poor PO fluid and solid intake  - Patient received 2L's of fluid and is on continuos fluid  - Bun/Cr: 34/7.2 -> 35/6.8  - GFR: 12  - CPK: 2616 --> 2238  - Utox: + THC, opiates,   - Urine Na: 36, Urine prot: 125  - PTH: 48, Vit D: 29  - A1C: 5.1   - F/U HIV, Syphilis, Hepatitis    - Strict I&O's: I: 2900mL, O: 325mL,      Code: full  Diet: NPO  Ppx: dvt: SCDs  Dispo:     Case discussed with staff    Susanna Lam MD  09/10/2018

## 2018-09-10 NOTE — PLAN OF CARE
Pt lives with his mother who is at bedside. Pt is independent and mother can provide assistance if needed and transportation upon d/c. TN gave pt D/C folder, brochure, and business card and instructed to call for any needs/concerns.     09/10/18 2620   Discharge Assessment   Assessment Type Discharge Planning Assessment   Confirmed/corrected address and phone number on facesheet? Yes   Assessment information obtained from? Patient   Expected Length of Stay (days) 2   Communicated expected length of stay with patient/caregiver yes   Prior to hospitilization cognitive status: Alert/Oriented   Prior to hospitalization functional status: Independent   Current cognitive status: Alert/Oriented   Current Functional Status: Independent   Lives With parent(s)   Able to Return to Prior Arrangements yes   Is patient able to care for self after discharge? Yes   Who are your caregiver(s) and their phone number(s)? Patty (mother)    Patient's perception of discharge disposition home or selfcare   Readmission Within The Last 30 Days no previous admission in last 30 days   Patient currently being followed by outpatient case management? No   Patient currently receives any other outside agency services? No   Equipment Currently Used at Home none   Do you have any problems affording any of your prescribed medications? No  (patient does not take any home medications)   Is the patient taking medications as prescribed? (n/a)   Does the patient have transportation home? Yes  (mother)   Transportation Available family or friend will provide   Does the patient receive services at the [facility]? No   Discharge Plan A Home with family   Discharge Plan B Home with family   Patient/Family In Agreement With Plan yes

## 2018-09-10 NOTE — PLAN OF CARE
Problem: Patient Care Overview  Goal: Plan of Care Review  Outcome: Ongoing (interventions implemented as appropriate)  Pt on RA with documented sats.98. Will continue to monitor.

## 2018-09-10 NOTE — PLAN OF CARE
Problem: Patient Care Overview  Goal: Plan of Care Review  Outcome: Ongoing (interventions implemented as appropriate)  Pt arrived safely to the unit, family at the bedside. Report and orders received. Pt oriented to room and unit. At the beginning giving attitude and ultimatums, but nice in the end. Fluids infusing, meds given per MAR. Pt continues to feel nausea, made himself throw up by sticking his finger down his throat. Pain relieved with one time dose of Morphine. No SOB, distress through the night. Vitals stable.

## 2018-09-11 VITALS
RESPIRATION RATE: 16 BRPM | WEIGHT: 126.31 LBS | HEIGHT: 67 IN | SYSTOLIC BLOOD PRESSURE: 185 MMHG | TEMPERATURE: 98 F | BODY MASS INDEX: 19.82 KG/M2 | OXYGEN SATURATION: 99 % | DIASTOLIC BLOOD PRESSURE: 102 MMHG | HEART RATE: 42 BPM

## 2018-09-11 LAB
ALBUMIN SERPL BCP-MCNC: 3.1 G/DL
ALP SERPL-CCNC: 55 U/L
ALT SERPL W/O P-5'-P-CCNC: 37 U/L
AMPHET+METHAMPHET UR QL: NEGATIVE
ANION GAP SERPL CALC-SCNC: 11 MMOL/L
AST SERPL-CCNC: 60 U/L
BARBITURATES UR QL SCN>200 NG/ML: NEGATIVE
BASOPHILS # BLD AUTO: 0.01 K/UL
BASOPHILS NFR BLD: 0.2 %
BENZODIAZ UR QL SCN>200 NG/ML: NEGATIVE
BILIRUB SERPL-MCNC: 0.9 MG/DL
BUN SERPL-MCNC: 32 MG/DL
BZE UR QL SCN: NEGATIVE
CALCIUM SERPL-MCNC: 8.4 MG/DL
CANNABINOIDS UR QL SCN: NORMAL
CHLORIDE SERPL-SCNC: 109 MMOL/L
CK SERPL-CCNC: 2579 U/L
CO2 SERPL-SCNC: 20 MMOL/L
CREAT SERPL-MCNC: 5.3 MG/DL
CREAT UR-MCNC: 73.1 MG/DL
DIASTOLIC DYSFUNCTION: NO
DIFFERENTIAL METHOD: ABNORMAL
EOSINOPHIL # BLD AUTO: 0 K/UL
EOSINOPHIL NFR BLD: 0.5 %
ERYTHROCYTE [DISTWIDTH] IN BLOOD BY AUTOMATED COUNT: 12.4 %
EST. GFR  (AFRICAN AMERICAN): 16 ML/MIN/1.73 M^2
EST. GFR  (NON AFRICAN AMERICAN): 14 ML/MIN/1.73 M^2
ESTIMATED PA SYSTOLIC PRESSURE: 19.32
GLUCOSE SERPL-MCNC: 91 MG/DL
HCT VFR BLD AUTO: 39 %
HGB BLD-MCNC: 13.7 G/DL
LYMPHOCYTES # BLD AUTO: 1.2 K/UL
LYMPHOCYTES NFR BLD: 27.9 %
MAGNESIUM SERPL-MCNC: 2.3 MG/DL
MCH RBC QN AUTO: 31.6 PG
MCHC RBC AUTO-ENTMCNC: 35.1 G/DL
MCV RBC AUTO: 90 FL
METHADONE UR QL SCN>300 NG/ML: NEGATIVE
MONOCYTES # BLD AUTO: 1.2 K/UL
MONOCYTES NFR BLD: 28.8 %
MYOGLOBIN 24H UR-MRATE: 38 MCG/L
NEUTROPHILS # BLD AUTO: 1.8 K/UL
NEUTROPHILS NFR BLD: 42.4 %
OB PNL GAST: POSITIVE
OPIATES UR QL SCN: NORMAL
PCP UR QL SCN>25 NG/ML: NEGATIVE
PHOSPHATE SERPL-MCNC: 4.3 MG/DL
PLATELET # BLD AUTO: 214 K/UL
PMV BLD AUTO: 9.5 FL
POTASSIUM SERPL-SCNC: 4.1 MMOL/L
PROT SERPL-MCNC: 5.6 G/DL
RBC # BLD AUTO: 4.33 M/UL
RETIRED EF AND QEF - SEE NOTES: 65 (ref 55–65)
SODIUM SERPL-SCNC: 140 MMOL/L
T PALLIDUM IGG SER QL IA: NEGATIVE
TOXICOLOGY INFORMATION: NORMAL
WBC # BLD AUTO: 4.27 K/UL

## 2018-09-11 PROCEDURE — 25000003 PHARM REV CODE 250: Performed by: STUDENT IN AN ORGANIZED HEALTH CARE EDUCATION/TRAINING PROGRAM

## 2018-09-11 PROCEDURE — 63600175 PHARM REV CODE 636 W HCPCS: Performed by: STUDENT IN AN ORGANIZED HEALTH CARE EDUCATION/TRAINING PROGRAM

## 2018-09-11 PROCEDURE — 82550 ASSAY OF CK (CPK): CPT

## 2018-09-11 PROCEDURE — 83735 ASSAY OF MAGNESIUM: CPT

## 2018-09-11 PROCEDURE — 25000003 PHARM REV CODE 250: Performed by: FAMILY MEDICINE

## 2018-09-11 PROCEDURE — 84100 ASSAY OF PHOSPHORUS: CPT

## 2018-09-11 PROCEDURE — C9113 INJ PANTOPRAZOLE SODIUM, VIA: HCPCS | Performed by: STUDENT IN AN ORGANIZED HEALTH CARE EDUCATION/TRAINING PROGRAM

## 2018-09-11 PROCEDURE — 36415 COLL VENOUS BLD VENIPUNCTURE: CPT

## 2018-09-11 PROCEDURE — 80053 COMPREHEN METABOLIC PANEL: CPT

## 2018-09-11 PROCEDURE — 85025 COMPLETE CBC W/AUTO DIFF WBC: CPT

## 2018-09-11 PROCEDURE — 80307 DRUG TEST PRSMV CHEM ANLYZR: CPT

## 2018-09-11 RX ORDER — HYDRALAZINE HYDROCHLORIDE 20 MG/ML
10 INJECTION INTRAMUSCULAR; INTRAVENOUS EVERY 6 HOURS PRN
Status: DISCONTINUED | OUTPATIENT
Start: 2018-09-11 | End: 2018-09-11 | Stop reason: HOSPADM

## 2018-09-11 RX ORDER — SODIUM CHLORIDE 9 MG/ML
INJECTION, SOLUTION INTRAVENOUS CONTINUOUS
Status: DISCONTINUED | OUTPATIENT
Start: 2018-09-11 | End: 2018-09-11 | Stop reason: HOSPADM

## 2018-09-11 RX ADMIN — SODIUM CHLORIDE: 0.9 INJECTION, SOLUTION INTRAVENOUS at 11:09

## 2018-09-11 RX ADMIN — ACETAMINOPHEN 650 MG: 325 TABLET ORAL at 09:09

## 2018-09-11 RX ADMIN — SODIUM CHLORIDE: 0.9 INJECTION, SOLUTION INTRAVENOUS at 06:09

## 2018-09-11 RX ADMIN — FAMOTIDINE 20 MG: 20 TABLET, FILM COATED ORAL at 09:09

## 2018-09-11 RX ADMIN — PANTOPRAZOLE SODIUM 40 MG: 40 INJECTION, POWDER, FOR SOLUTION INTRAVENOUS at 09:09

## 2018-09-11 RX ADMIN — SUCRALFATE 1 G: 1 SUSPENSION ORAL at 11:09

## 2018-09-11 RX ADMIN — HYDROXYZINE PAMOATE 25 MG: 25 CAPSULE ORAL at 01:09

## 2018-09-11 RX ADMIN — ACETAMINOPHEN 650 MG: 325 TABLET ORAL at 01:09

## 2018-09-11 NOTE — PROGRESS NOTES
Dr. Woods notified of patient's /99. Pt not currently complaining of pain or n/v. Pt states he did vomit this am and it was foamy and that he spit up some blood.

## 2018-09-11 NOTE — PROGRESS NOTES
PGY-1 Progress Note [facility]    Follow up for:   Chief Complaint   Patient presents with    Emesis     vomiting since yesterday.         Hospital Stay Day 1    Subjective: Patient is uncooperative during exam. Patient states that he is tired and wants to sleep. Patient tolerated his diet but had 1 episode of foamy, bloody emesis. Patient complains about cramping pain in the left upper abdomen. Also complains about LBP. Patient is not ambulating because he says there is nothing for him to do.     Scheduled Meds:   famotidine  20 mg Oral BID    pantoprazole  40 mg Intravenous Daily    sucralfate  1 g Oral Q6H     Continuous Infusions:   sodium chloride 0.9% 200 mL/hr at 18 0622     PRN Meds:acetaminophen, calcium carbonate, dextrose 50%, dextrose 50%, glucagon (human recombinant), glucose, glucose, hydrOXYzine pamoate, ondansetron, ramelteon, sodium chloride 0.9%    Review of patient's allergies indicates:  No Known Allergies    Objectives:     Vitals(Most Recent)      BP  Min: 129/83  Max: 171/99  Temp  Av.6 °F (37 °C)  Min: 97.5 °F (36.4 °C)  Max: 99 °F (37.2 °C)  Pulse  Av.7  Min: 51  Max: 63  Resp  Av.7  Min: 16  Max: 20  SpO2  Av.5 %  Min: 99 %  Max: 100 %             Vitals(Sgal17p)  Temp:  [97.5 °F (36.4 °C)-99 °F (37.2 °C)]   Pulse:  [51-63]   Resp:  [16-20]   BP: (129-171)/(76-99)   SpO2:  [99 %-100 %]     I & O(Oelp60j)    Intake/Output Summary (Last 24 hours) at 2018 0936  Last data filed at 2018 0600  Gross per 24 hour   Intake 1372.5 ml   Output 1850 ml   Net -477.5 ml     General: AAOx3. NAD  HEENT: NCAT. PERRLA. EOMI.   Neck: Supple. No JVD. No LAD.    CV: RRR. NL S1/S2. Systolic murmur present  Chest: NL effort. CTAB. No R/R/W.   Abd: +BS x 4. Soft. ND. Mildlly tender with palpation in the RUQ. Negative Langley's sign. No rebound or guarding.   Ext: No C/C/E. Peripheral pulses intact. NL ROM. Muscle twitching  Skin: Intact. No rash. Multiple tattoos.  Neuro: CN  II-XII intact. No focal deficit. Strength 5/5 throughout. Sensation intact.   Psych: Very emotionally labile. Inappropriate affect.     LABS  CBC  Recent Labs   Lab  09/09/18   1532  09/10/18   0428  09/11/18   0605   WBC  7.43  6.12  4.27   RBC  4.85  4.39*  4.33*   HGB  14.7  13.7*  13.7*   HCT  43.9  39.6*  39.0*   PLT  240  227  214   MCV  91  90  90   MCH  30.3  31.2*  31.6*   MCHC  33.5  34.6  35.1     BMP  Recent Labs   Lab  09/10/18   0427  09/10/18   1620 09/11/18   0605   NA  136  137  140   K  4.0  4.0  4.1   CO2  21*  24  20*   CL  103  103  109   BUN  35*  39*  32*   CREATININE  6.8*  6.6*  5.3*   GLU  73  102  91       Recent Labs   Lab  09/09/18   1532  09/10/18   0427  09/10/18   1620 09/11/18   0605   CALCIUM  9.8  8.7  8.9  8.4*   MG   --   2.4   --   2.3   PHOS  4.5  4.6*   --   4.3     LFT  Recent Labs   Lab  09/09/18   1532  09/10/18   0427 09/11/18   0605   PROT  7.5  5.6*  5.6*   ALBUMIN  4.3  3.2*  3.1*   BILITOT  1.2*  1.2*  0.9   AST  51*  41*  60*   ALKPHOS  70  51*  55   ALT  25  20  37         LAST HbA1c  Lab Results   Component Value Date    HGBA1C 5.1 09/10/2018           Imaging  Imaging Results          CT Renal Stone Study ABD Pelvis WO (Final result)  Result time 09/09/18 16:42:47    Final result by Meghan Dias MD (09/09/18 16:42:47)                 Impression:      No evidence of nephrolithiasis or obstructive uropathy.      Electronically signed by: Meghan Dias MD  Date:    09/09/2018  Time:    16:42             Narrative:    EXAMINATION:  CT RENAL STONE STUDY ABD PELVIS WO    CLINICAL HISTORY:  Abdominal pain, unspecified;    TECHNIQUE:  Low dose axial images, sagittal and coronal reformations were obtained from the lung bases to the pubic symphysis.  Contrast was not administered.    COMPARISON:  None.    FINDINGS:  The lung bases are clear.    The liver is normal in size and contour.  Evaluation for focal lesions is limited by lack of intravenous contrast.   The gallbladder is nondistended.  The pancreas, spleen, and adrenal glands have a normal noncontrast appearance.  Kidneys demonstrate normal cortical thickness with no evidence of hydronephrosis or nephrolithiasis.    Stomach and small bowel are nondistended.  There is no evidence of colonic obstruction.    Bladder and reproductive organs have a normal appearance.    There is no evidence of free air or free fluid in the abdomen or pelvis.    No osseous abnormalities are seen.                                Assessment/Plan: 23 yo male with no past med history presenting for 2 days of nausea and vomiting after food ingestions found to have an elevated creatine of 7.2. DDx includes food-borne illness, viral gastroenteritis, drug ingestion, rhabomyolysis.  * Acute renal failure     - Patient has multiple episodes of emesis and poor PO fluid and solid intake  - Patient received 2L's of fluid and is on continuos fluid  - Bun/Cr: 34/7.2 -> 35/6.8-> 32/5.3  - GFR: 12 -> 16  - CPK: 2616 --> 2238-> 2579  - Utox: + THC, opiates,   - Urine Na: 36, Urine prot: 125  - PTH: 48, Vit D: 29  - A1C: 5.1  - HIV and Hepatitis: Neg,  - Syphilis: Pending  - Strict I&O's: I: 1300mL, O: 74816rS,   - Pending Urine myoglobin     Psych  - Patient is emotionally labile with inappropriate affect  - Consult Psych for concern of underlying psych disorder    Code: full  Diet: Full Clears  Ppx: dvt: SCDs  Dispo:     Case discussed with staff    Susanna Lam MD  09/11/2018

## 2018-09-11 NOTE — PLAN OF CARE
Problem: Patient Care Overview  Goal: Plan of Care Review  Outcome: Ongoing (interventions implemented as appropriate)  Pt complaining of abdominal pain through the night. Continues to have N/V. When pt asked if he made himself throw up again, like before with his finger, pt joked that he used a drinking straw. When asked if this is true, he stated he's messing. Upon inspection of the blue throw up bag there was a straw. When pt asked about it, stated it was his baby's mama's and it has fallen on the ground, so he just placed it there. The Vomit bag is filled with small amount of what looks like thick sputum.

## 2018-09-11 NOTE — DISCHARGE SUMMARY
"[facility]  Discharge Summary      Patient Name: Alka Rowe  MRN: [id number]  Admission Date: 9/9/2018  Hospital Length of Stay: 1 days  Discharge Date and Time: 9/11/2018  4:54 PM  Attending Physician: Dr. Ericka Cosme   Discharging Provider: Susanna Lam MD  Primary Care Provider: Tee Guidry MD     HPI: 23 yo male with no med history presenting with a history of nausea, vomiting starting Friday night 10 minutes after eating white beans and rice from a questionable source. Patient states that he has multiple episodes of emesis and has not tolerated any PO food or liquid intake. Patient has tried to drink orange juice, cranberry juice and water. Patient also report decrease urine output since Friday. Patient has achy abdominal pain. Patient denies any fever, constipation, diarrhea, chest pain, sob. Patient denies sick contacts or knows anyone else who ate the white beans and rice. Patient reports marijuana use with his last use on Thursday but denies alcohol and illicit drug use. Patient used 3 doses of Aleve for a headache.     Hospital Course: In the ED, patient continued to have episodes of nausea and vomiting. Patient's Cr was 7.2, GFR of 11, K was 3.4, CPK of 2616. Patient was started on IV fluids and admitted for kidney injury. U tox was positive for THC and opiates.     Patient was started on aggressive hydration and Zofran for nausea. Patient refused Zofran for nausea. Patient was started on NPO for concerns inability to tolerate PO. Patient was slowly transitioned to a full liquid diet. Patient told multiple nurses that he use his fingers or straw to force emesis. When nurse questioned him again, he would state that he was only "messing around". Emesis was sent for occult blood and was found positive. Patient was started on sucralfate and famotidine. Patient also forced emesis after a medication because it did not taste good. Patient required multiple visits and " discussions to re-emphasis renal failure. Cr trended from 7.2 to 5.3. CPK trended from 2616 to 2579.     Patient requested a hospital letter to be fax to court to excuse him from a court date. Letter was promptly faxed verifying patient was admitted in the hospital on 9/11. At the end of the 9/11, patient began to become upset that he continued to have abdominal pain. Patient was offered medication. However, patient wanted to leave AMA. Risks of kidney failure without aggressive IV hydration was explained including the need for dialysis along with imminent death. Patient emphasize that he understood the risk and wanted to AMA and just wanted to leave the hospital.     Consults: Psych      Final Active Diagnoses:    Diagnosis Date Noted POA    PRINCIPAL PROBLEM:  Acute renal failure [N17.9] 09/09/2018 Clinically Undetermined    Murmur, cardiac [R01.1]  Yes    Vomiting [R11.10]  Yes    Non-traumatic rhabdomyolysis [M62.82]  Yes      Problems Resolved During this Admission:      Discharged Condition: against medical advice    Disposition: Left Against Medical Adv*    Follow Up:  Follow-up Information     Tee Guidry MD.    Specialty:  Pediatrics  Contact information:  [address] [facility] [address] [phone]                 Patient Instructions:   No discharge procedures on file.  Medications:    Susanna Lam MD  Family Medicine  [facility]

## 2018-09-11 NOTE — PLAN OF CARE
Problem: Patient Care Overview  Goal: Plan of Care Review  Outcome: Ongoing (interventions implemented as appropriate)  Pt is AAOx3 with complaints of abd pain and intermittent nausea with vomiting. Pt receiving IV zofran PRN. Pt tolerated clear liquid dinner tray. Pt receiving IV fluids. Pt urinating in urinal clear yellow urine.

## 2018-10-15 NOTE — PHYSICIAN QUERY
"PT Name: Alka Rowe  MR #: [id number]     Physician Query Form - Documentation Clarification      CDS/: NATALY Lane, RN, CCDS               Contact information: [email or web address]    This form is a permanent document in the medical record.     Query Date: October 15, 2018    By submitting this query, we are merely seeking further clarification of documentation. Please utilize your independent clinical judgment when addressing the question(s) below.    The Medical record reflects the following:    Supporting Clinical Findings Location in Medical Record     Pt states he did vomit this am and it was foamy and that he spit up some blood.     Patient told multiple nurses that he use his fingers or straw to force emesis. When nurse questioned him again, he would state that he was only "messing around". Emesis was sent for occult blood and was found positive. Patient was started on sucralfate and famotidine. Patient also forced emesis after a medication because it did not taste good.      9/11 Nurse Progress Note      9/11 Discharge Summary     Occult blood-positive   Specimen Type: Gastric  Specimen Source: Gastric Fluid   9/10 Lab                                                                            Doctor, Please specify diagnosis or diagnoses associated with above clinical findings.    Please clarify the diagnosis related to positive occult blood.    Provider Use Only      [  ]  Bleeding from GI tract/stomach (hematemesis)    [  ]  Bleeding from other source (please specify): __________    [  ]  Bleeding from throat trauma    [  ]  Gastric fluid occult blood finding is not clinically significant    [ x ]  Other (specify): __history of forced emesis____________________                                                                                                             [  ] Clinically undetermined            "

## 2019-02-09 ENCOUNTER — HOSPITAL ENCOUNTER (EMERGENCY)
Facility: HOSPITAL | Age: 24
Discharge: HOME | End: 2019-02-09
Payer: MEDICAID

## 2019-02-09 VITALS
RESPIRATION RATE: 16 BRPM | OXYGEN SATURATION: 99 % | DIASTOLIC BLOOD PRESSURE: 91 MMHG | WEIGHT: 125 LBS | HEART RATE: 79 BPM | TEMPERATURE: 99 F | HEIGHT: 68 IN | SYSTOLIC BLOOD PRESSURE: 141 MMHG | BODY MASS INDEX: 18.94 KG/M2

## 2019-02-09 DIAGNOSIS — R07.9 CHEST PAIN: ICD-10-CM

## 2019-02-09 DIAGNOSIS — Z72.0 TOBACCO ABUSE: Primary | ICD-10-CM

## 2019-02-09 PROCEDURE — 93005 ELECTROCARDIOGRAM TRACING: CPT

## 2019-02-09 PROCEDURE — 25000003 PHARM REV CODE 250

## 2019-02-09 PROCEDURE — 99283 EMERGENCY DEPT VISIT LOW MDM: CPT | Mod: 25

## 2019-02-09 RX ORDER — IBUPROFEN 600 MG/1
600 TABLET ORAL
Status: COMPLETED | OUTPATIENT
Start: 2019-02-09 | End: 2019-02-09

## 2019-02-09 RX ORDER — IBUPROFEN 600 MG/1
600 TABLET ORAL EVERY 6 HOURS PRN
Qty: 20 TABLET | Refills: 0 | Status: SHIPPED | OUTPATIENT
Start: 2019-02-09 | End: 2019-08-23

## 2019-02-09 RX ADMIN — IBUPROFEN 600 MG: 600 TABLET, FILM COATED ORAL at 09:02

## 2019-02-10 NOTE — ED PROVIDER NOTES
Encounter Date: 2/9/2019    SCRIBE #1 NOTE: I, Claudette Mota, am scribing for, and in the presence of,  Dr. Muro. I have scribed the entire note.       History     Chief Complaint   Patient presents with    Shortness of Breath     PT C/O SOB AND CHEST PAIN X3 WEEKS.     Time seen by provider: 8:54 PM    This is a 23 y.o. male who presents with complaint of left sided chest pain x 3 weeks. Pt states that his chest pain started when he began smoking cigarettes 3 weeks ago. He denies any SOB. Denies illicit drug and ETOH use. Pt notes Acute Kidney failure last year.      The history is provided by the patient.     Review of patient's allergies indicates:  No Known Allergies  Past Medical History:   Diagnosis Date    Renal disorder     Seizures      Past Surgical History:   Procedure Laterality Date    HAND SURGERY       No family history on file.  Social History     Tobacco Use    Smoking status: Current Every Day Smoker     Packs/day: 0.50     Types: Cigarettes    Smokeless tobacco: Never Used   Substance Use Topics    Alcohol use: No    Drug use: No     Review of Systems   All other systems reviewed and are negative.      Physical Exam     Initial Vitals [02/09/19 2043]   BP Pulse Resp Temp SpO2   (!) 141/91 79 16 99 °F (37.2 °C) 99 %      MAP       --         Physical Exam    Nursing note and vitals reviewed.  Constitutional: He appears well-developed.   HENT:   Head: Normocephalic and atraumatic.   Eyes: EOM are normal.   Neck: Normal range of motion. Neck supple.   Cardiovascular: Normal rate and regular rhythm.   Pulmonary/Chest: Breath sounds normal. No respiratory distress.   Abdominal: Soft. There is no tenderness.   Musculoskeletal: Normal range of motion.   No acute deformity   Neurological: He is alert and oriented to person, place, and time.   Skin: Skin is warm and dry.   Psychiatric: He has a normal mood and affect.         ED Course   Procedures  Labs Reviewed - No data to display  EKG  Readings: (Independently Interpreted)   Heart Rate: 60.   Interpretation per ED physician. Sinus rhythm without ectopy. No emergently concerning ST or T wave changes. No STEMI.  QTc: 384  Normal EKG            Medical Decision Making:   Initial Assessment:   This is a 23 y.o male who presents to the ED due to left sided chest pain x 3 weeks ago. Pt states pain began when he began using smoking cigarettes 3 weeks ago. Heart score zero and EKG is normal. Will discharge home with NSAIDs and instructions to stop smoking and to follow up with PCP.   ED Management:  Patient is nontoxic appearing in the ED.  No persistent emergent issues detected.  Exam benign. Patient will return to the ED as needed for any deterioration or any other concerns.  Verbal discharge instructions and return precautions given.  We will discharge home to follow up with PCP.                        Clinical Impression:     1. Tobacco abuse    2. Chest pain            Disposition:   Disposition: Discharged  Condition: Stable   I personally performed the services described in this documentation. All medical record entries made by the scribe were at my direction and in my presence.  I have reviewed the chart and agree that the record reflects my personal performance and is accurate and complete within the limitations of emergency medical charting.   --Salvador Muro M.D. 11:21 PM 02/09/2019                 Salvador Muro MD  02/09/19 [id number]

## 2019-02-10 NOTE — ED NOTES
Patient to ED reports chest pain started when he started smoking 3 weeks ago. Patient denies SOB. Patient reports pain has been the same, has not gotten worse. Patient able to speak in full sentence, no distress noted. Dr. Muro in room instructed patient to stop smoking. Patient verbalized understanding.

## 2019-08-23 ENCOUNTER — HOSPITAL ENCOUNTER (EMERGENCY)
Facility: HOSPITAL | Age: 24
Discharge: ANOTHER HOSPITAL | End: 2019-08-23
Attending: EMERGENCY MEDICINE
Payer: MEDICAID

## 2019-08-23 ENCOUNTER — HOSPITAL ENCOUNTER (INPATIENT)
Facility: HOSPITAL | Age: 24
LOS: 3 days | Discharge: HOME | DRG: 208 | End: 2019-08-26
Attending: EMERGENCY MEDICINE | Admitting: PSYCHIATRY & NEUROLOGY
Payer: MEDICAID

## 2019-08-23 VITALS
TEMPERATURE: 97 F | RESPIRATION RATE: 28 BRPM | HEIGHT: 68 IN | BODY MASS INDEX: 18.94 KG/M2 | OXYGEN SATURATION: 92 % | WEIGHT: 125 LBS | SYSTOLIC BLOOD PRESSURE: 120 MMHG | HEART RATE: 128 BPM | DIASTOLIC BLOOD PRESSURE: 63 MMHG

## 2019-08-23 DIAGNOSIS — G40.901 STATUS EPILEPTICUS: ICD-10-CM

## 2019-08-23 DIAGNOSIS — Z97.8 ENDOTRACHEALLY INTUBATED: ICD-10-CM

## 2019-08-23 DIAGNOSIS — J69.0 ASPIRATION PNEUMONIA OF RIGHT LUNG, UNSPECIFIED ASPIRATION PNEUMONIA TYPE, UNSPECIFIED PART OF LUNG: ICD-10-CM

## 2019-08-23 DIAGNOSIS — Z46.59 ENCOUNTER FOR FEEDING TUBE PLACEMENT: ICD-10-CM

## 2019-08-23 DIAGNOSIS — E87.20 LACTIC ACIDOSIS: ICD-10-CM

## 2019-08-23 DIAGNOSIS — J96.01 ACUTE RESPIRATORY FAILURE WITH HYPOXIA: ICD-10-CM

## 2019-08-23 DIAGNOSIS — J69.0 ASPIRATION PNEUMONIA OF RIGHT LUNG DUE TO GASTRIC SECRETIONS, UNSPECIFIED PART OF LUNG: Primary | ICD-10-CM

## 2019-08-23 DIAGNOSIS — R56.9 SEIZURE-LIKE ACTIVITY: Primary | ICD-10-CM

## 2019-08-23 DIAGNOSIS — I50.9 CHF (CONGESTIVE HEART FAILURE): ICD-10-CM

## 2019-08-23 DIAGNOSIS — G93.41 ACUTE METABOLIC ENCEPHALOPATHY: ICD-10-CM

## 2019-08-23 PROBLEM — R57.0 CARDIOGENIC SHOCK: Status: ACTIVE | Noted: 2019-08-23

## 2019-08-23 PROBLEM — F11.10 OPIATE ABUSE, CONTINUOUS: Status: ACTIVE | Noted: 2019-08-23

## 2019-08-23 PROBLEM — G93.40 ENCEPHALOPATHY ACUTE: Status: ACTIVE | Noted: 2019-08-23

## 2019-08-23 PROBLEM — F14.10 COCAINE ABUSE: Status: ACTIVE | Noted: 2019-08-23

## 2019-08-23 LAB
ALBUMIN SERPL BCP-MCNC: 3.8 G/DL (ref 3.5–5.2)
ALLENS TEST: ABNORMAL
ALP SERPL-CCNC: 87 U/L (ref 55–135)
ALT SERPL W/O P-5'-P-CCNC: 242 U/L (ref 10–44)
AMMONIA PLAS-SCNC: 129 UMOL/L (ref 10–50)
AMPHET+METHAMPHET UR QL: NEGATIVE
ANION GAP SERPL CALC-SCNC: 22 MMOL/L (ref 8–16)
APAP SERPL-MCNC: <3 UG/ML (ref 10–20)
AST SERPL-CCNC: 409 U/L (ref 10–40)
B-OH-BUTYR BLD STRIP-SCNC: 0.1 MMOL/L (ref 0–0.5)
BACTERIA #/AREA URNS AUTO: ABNORMAL /HPF
BACTERIA #/AREA URNS HPF: NORMAL /HPF
BARBITURATES UR QL SCN>200 NG/ML: NEGATIVE
BASOPHILS NFR BLD: 1 % (ref 0–1.9)
BENZODIAZ UR QL SCN>200 NG/ML: NEGATIVE
BILIRUB SERPL-MCNC: 0.3 MG/DL (ref 0.1–1)
BILIRUB UR QL STRIP: NEGATIVE
BILIRUB UR QL STRIP: NEGATIVE
BUN SERPL-MCNC: 10 MG/DL (ref 6–30)
BUN SERPL-MCNC: 9 MG/DL (ref 6–20)
BUN SERPL-MCNC: 9 MG/DL (ref 6–30)
BZE UR QL SCN: ABNORMAL
CALCIUM SERPL-MCNC: 8.5 MG/DL (ref 8.7–10.5)
CANNABINOIDS UR QL SCN: NEGATIVE
CHLORIDE SERPL-SCNC: 103 MMOL/L (ref 95–110)
CHLORIDE SERPL-SCNC: 107 MMOL/L (ref 95–110)
CHLORIDE SERPL-SCNC: 87 MMOL/L (ref 95–110)
CK SERPL-CCNC: 354 U/L (ref 20–200)
CK SERPL-CCNC: 393 U/L (ref 20–200)
CLARITY UR REFRACT.AUTO: CLEAR
CLARITY UR: CLEAR
CO2 SERPL-SCNC: 14 MMOL/L (ref 23–29)
COLOR UR AUTO: YELLOW
COLOR UR: YELLOW
CREAT SERPL-MCNC: 1.4 MG/DL (ref 0.5–1.4)
CREAT SERPL-MCNC: 2 MG/DL (ref 0.5–1.4)
CREAT SERPL-MCNC: 2.7 MG/DL (ref 0.5–1.4)
CREAT UR-MCNC: 7.1 MG/DL (ref 23–375)
DELSYS: ABNORMAL
DIFFERENTIAL METHOD: ABNORMAL
EOSINOPHIL NFR BLD: 1 % (ref 0–8)
ERYTHROCYTE [DISTWIDTH] IN BLOOD BY AUTOMATED COUNT: 13.1 % (ref 11.5–14.5)
ERYTHROCYTE [SEDIMENTATION RATE] IN BLOOD BY WESTERGREN METHOD: 18 MM/H
ERYTHROCYTE [SEDIMENTATION RATE] IN BLOOD BY WESTERGREN METHOD: 26 MM/H
EST. GFR  (AFRICAN AMERICAN): 53 ML/MIN/1.73 M^2
EST. GFR  (NON AFRICAN AMERICAN): 46 ML/MIN/1.73 M^2
ETHANOL SERPL-MCNC: 128 MG/DL
FIO2: 40
FIO2: 60
FIO2: 60
GLUCOSE SERPL-MCNC: 422 MG/DL (ref 70–110)
GLUCOSE SERPL-MCNC: 66 MG/DL (ref 70–110)
GLUCOSE SERPL-MCNC: >700 MG/DL (ref 70–110)
GLUCOSE UR QL STRIP: ABNORMAL
GLUCOSE UR QL STRIP: NEGATIVE
GRAN CASTS UR QL COMP ASSIST: 22 /LPF
HCO3 UR-SCNC: 14.1 MMOL/L (ref 24–28)
HCO3 UR-SCNC: 18.4 MMOL/L (ref 24–28)
HCO3 UR-SCNC: 20.6 MMOL/L (ref 24–28)
HCO3 UR-SCNC: 21.1 MMOL/L (ref 24–28)
HCT VFR BLD AUTO: 49.7 % (ref 40–54)
HCT VFR BLD CALC: 55 %PCV (ref 36–54)
HCT VFR BLD CALC: 56 %PCV (ref 36–54)
HCT VFR BLD CALC: 58 %PCV (ref 36–54)
HCT VFR BLD CALC: 59 %PCV (ref 36–54)
HGB BLD-MCNC: 15.8 G/DL (ref 14–18)
HGB BLD-MCNC: 19 G/DL
HGB BLD-MCNC: 20 G/DL
HGB UR QL STRIP: ABNORMAL
HGB UR QL STRIP: ABNORMAL
HYALINE CASTS #/AREA URNS LPF: 0 /LPF
HYALINE CASTS UR QL AUTO: 17 /LPF
INR PPP: 1.2 (ref 0.8–1.2)
KETONES UR QL STRIP: NEGATIVE
KETONES UR QL STRIP: NEGATIVE
LACTATE SERPL-SCNC: 0.6 MMOL/L (ref 0.5–2.2)
LACTATE SERPL-SCNC: 11.2 MMOL/L (ref 0.5–2.2)
LEUKOCYTE ESTERASE UR QL STRIP: NEGATIVE
LEUKOCYTE ESTERASE UR QL STRIP: NEGATIVE
LYMPHOCYTES NFR BLD: 35 % (ref 18–48)
MAGNESIUM SERPL-MCNC: 2.5 MG/DL (ref 1.6–2.6)
MCH RBC QN AUTO: 30.9 PG (ref 27–31)
MCHC RBC AUTO-ENTMCNC: 31.8 G/DL (ref 32–36)
MCV RBC AUTO: 97 FL (ref 82–98)
METHADONE UR QL SCN>300 NG/ML: NEGATIVE
MICROSCOPIC COMMENT: ABNORMAL
MICROSCOPIC COMMENT: NORMAL
MODE: ABNORMAL
MONOCYTES NFR BLD: 3 % (ref 4–15)
NEUTROPHILS NFR BLD: 60 % (ref 38–73)
NITRITE UR QL STRIP: NEGATIVE
NITRITE UR QL STRIP: NEGATIVE
OPIATES UR QL SCN: ABNORMAL
PCO2 BLDA: 48.4 MMHG (ref 35–45)
PCO2 BLDA: 49.3 MMHG (ref 35–45)
PCO2 BLDA: 57.4 MMHG (ref 35–45)
PCO2 BLDA: 80.4 MMHG (ref 35–45)
PCP UR QL SCN>25 NG/ML: NEGATIVE
PEEP: 5
PH SMN: 7.02 [PH] (ref 7.35–7.45)
PH SMN: 7.07 [PH] (ref 7.35–7.45)
PH SMN: 7.11 [PH] (ref 7.35–7.45)
PH SMN: 7.24 [PH] (ref 7.35–7.45)
PH UR STRIP: 5 [PH] (ref 5–8)
PH UR STRIP: 8 [PH] (ref 5–8)
PHOSPHATE SERPL-MCNC: 8.4 MG/DL (ref 2.7–4.5)
PLATELET # BLD AUTO: 236 K/UL (ref 150–350)
PMV BLD AUTO: 9.5 FL (ref 9.2–12.9)
PO2 BLDA: 47 MMHG (ref 80–100)
PO2 BLDA: 49 MMHG (ref 40–60)
PO2 BLDA: 52 MMHG (ref 80–100)
PO2 BLDA: 81 MMHG (ref 80–100)
POC BE: -10 MMOL/L
POC BE: -11 MMOL/L
POC BE: -16 MMOL/L
POC BE: -6 MMOL/L
POC IONIZED CALCIUM: 0.85 MMOL/L (ref 1.06–1.42)
POC IONIZED CALCIUM: 1.08 MMOL/L (ref 1.06–1.42)
POC IONIZED CALCIUM: 1.1 MMOL/L (ref 1.06–1.42)
POC IONIZED CALCIUM: 1.14 MMOL/L (ref 1.06–1.42)
POC SATURATED O2: 67 % (ref 95–100)
POC SATURATED O2: 67 % (ref 95–100)
POC SATURATED O2: 68 % (ref 95–100)
POC SATURATED O2: 93 % (ref 95–100)
POC TCO2 (MEASURED): 17 MMOL/L (ref 23–29)
POC TCO2 (MEASURED): 20 MMOL/L (ref 23–29)
POC TCO2: 16 MMOL/L (ref 24–29)
POC TCO2: 20 MMOL/L (ref 23–27)
POC TCO2: 23 MMOL/L (ref 23–27)
POC TCO2: 23 MMOL/L (ref 23–27)
POCT GLUCOSE: 132 MG/DL (ref 70–110)
POCT GLUCOSE: 304 MG/DL (ref 70–110)
POCT GLUCOSE: 59 MG/DL (ref 70–110)
POCT GLUCOSE: 61 MG/DL (ref 70–110)
POCT GLUCOSE: 65 MG/DL (ref 70–110)
POCT GLUCOSE: 77 MG/DL (ref 70–110)
POCT GLUCOSE: 80 MG/DL (ref 70–110)
POCT GLUCOSE: 82 MG/DL (ref 70–110)
POCT GLUCOSE: 87 MG/DL (ref 70–110)
POTASSIUM BLD-SCNC: 4 MMOL/L (ref 3.5–5.1)
POTASSIUM BLD-SCNC: 4.1 MMOL/L (ref 3.5–5.1)
POTASSIUM BLD-SCNC: 4.5 MMOL/L (ref 3.5–5.1)
POTASSIUM BLD-SCNC: 5 MMOL/L (ref 3.5–5.1)
POTASSIUM SERPL-SCNC: 3.1 MMOL/L (ref 3.5–5.1)
PROT SERPL-MCNC: 6.8 G/DL (ref 6–8.4)
PROT UR QL STRIP: ABNORMAL
PROT UR QL STRIP: NEGATIVE
PROTHROMBIN TIME: 12.4 SEC (ref 9–12.5)
PS: 10
RBC # BLD AUTO: 5.12 M/UL (ref 4.6–6.2)
RBC #/AREA URNS AUTO: 8 /HPF (ref 0–4)
RBC #/AREA URNS HPF: 2 /HPF (ref 0–4)
SALICYLATES SERPL-MCNC: <5 MG/DL (ref 15–30)
SAMPLE: ABNORMAL
SITE: ABNORMAL
SODIUM BLD-SCNC: 123 MMOL/L (ref 136–145)
SODIUM BLD-SCNC: 141 MMOL/L (ref 136–145)
SODIUM BLD-SCNC: 142 MMOL/L (ref 136–145)
SODIUM BLD-SCNC: 143 MMOL/L (ref 136–145)
SODIUM SERPL-SCNC: 139 MMOL/L (ref 136–145)
SP GR UR STRIP: 1.01 (ref 1–1.03)
SP GR UR STRIP: 1.02 (ref 1–1.03)
SQUAMOUS #/AREA URNS HPF: 0 /HPF
TOXICOLOGY INFORMATION: ABNORMAL
TROPONIN I SERPL DL<=0.01 NG/ML-MCNC: 0.04 NG/ML (ref 0–0.03)
TSH SERPL DL<=0.005 MIU/L-ACNC: 3.66 UIU/ML (ref 0.4–4)
URN SPEC COLLECT METH UR: ABNORMAL
URN SPEC COLLECT METH UR: ABNORMAL
UROBILINOGEN UR STRIP-ACNC: NEGATIVE EU/DL
VT: 400
VT: 400
WBC # BLD AUTO: 14.47 K/UL (ref 3.9–12.7)
WBC #/AREA URNS AUTO: 9 /HPF (ref 0–5)
WBC #/AREA URNS HPF: 0 /HPF (ref 0–5)
YEAST URNS QL MICRO: NORMAL

## 2019-08-23 PROCEDURE — 80307 DRUG TEST PRSMV CHEM ANLYZR: CPT

## 2019-08-23 PROCEDURE — 36600 WITHDRAWAL OF ARTERIAL BLOOD: CPT | Mod: 59

## 2019-08-23 PROCEDURE — 31500 INSERT EMERGENCY AIRWAY: CPT

## 2019-08-23 PROCEDURE — 25000003 PHARM REV CODE 250

## 2019-08-23 PROCEDURE — 99900035 HC TECH TIME PER 15 MIN (STAT)

## 2019-08-23 PROCEDURE — 85610 PROTHROMBIN TIME: CPT

## 2019-08-23 PROCEDURE — 96374 THER/PROPH/DIAG INJ IV PUSH: CPT

## 2019-08-23 PROCEDURE — 87147 CULTURE TYPE IMMUNOLOGIC: CPT

## 2019-08-23 PROCEDURE — 63600175 PHARM REV CODE 636 W HCPCS

## 2019-08-23 PROCEDURE — 36600 WITHDRAWAL OF ARTERIAL BLOOD: CPT

## 2019-08-23 PROCEDURE — 84484 ASSAY OF TROPONIN QUANT: CPT | Mod: 91

## 2019-08-23 PROCEDURE — 25000003 PHARM REV CODE 250: Performed by: EMERGENCY MEDICINE

## 2019-08-23 PROCEDURE — 63600175 PHARM REV CODE 636 W HCPCS: Performed by: NURSE PRACTITIONER

## 2019-08-23 PROCEDURE — 20000000 HC ICU ROOM

## 2019-08-23 PROCEDURE — 94003 VENT MGMT INPAT SUBQ DAY: CPT

## 2019-08-23 PROCEDURE — 99291 PR CRITICAL CARE, E/M 30-74 MINUTES: ICD-10-PCS | Mod: ,,, | Performed by: PSYCHIATRY & NEUROLOGY

## 2019-08-23 PROCEDURE — 99900026 HC AIRWAY MAINTENANCE (STAT)

## 2019-08-23 PROCEDURE — 99291 CRITICAL CARE FIRST HOUR: CPT | Mod: ,,, | Performed by: EMERGENCY MEDICINE

## 2019-08-23 PROCEDURE — 87040 BLOOD CULTURE FOR BACTERIA: CPT | Mod: 59

## 2019-08-23 PROCEDURE — 63600175 PHARM REV CODE 636 W HCPCS: Performed by: EMERGENCY MEDICINE

## 2019-08-23 PROCEDURE — 94002 VENT MGMT INPAT INIT DAY: CPT

## 2019-08-23 PROCEDURE — 82803 BLOOD GASES ANY COMBINATION: CPT

## 2019-08-23 PROCEDURE — 25000003 PHARM REV CODE 250: Performed by: STUDENT IN AN ORGANIZED HEALTH CARE EDUCATION/TRAINING PROGRAM

## 2019-08-23 PROCEDURE — 94761 N-INVAS EAR/PLS OXIMETRY MLT: CPT

## 2019-08-23 PROCEDURE — 82550 ASSAY OF CK (CPK): CPT | Mod: 91

## 2019-08-23 PROCEDURE — 96367 TX/PROPH/DG ADDL SEQ IV INF: CPT | Mod: 59

## 2019-08-23 PROCEDURE — 80047 BASIC METABLC PNL IONIZED CA: CPT

## 2019-08-23 PROCEDURE — 83735 ASSAY OF MAGNESIUM: CPT

## 2019-08-23 PROCEDURE — 99291 CRITICAL CARE FIRST HOUR: CPT | Mod: 25

## 2019-08-23 PROCEDURE — S0030 INJECTION, METRONIDAZOLE: HCPCS | Performed by: EMERGENCY MEDICINE

## 2019-08-23 PROCEDURE — 83605 ASSAY OF LACTIC ACID: CPT

## 2019-08-23 PROCEDURE — 80320 DRUG SCREEN QUANTALCOHOLS: CPT

## 2019-08-23 PROCEDURE — 87205 SMEAR GRAM STAIN: CPT

## 2019-08-23 PROCEDURE — 82010 KETONE BODYS QUAN: CPT

## 2019-08-23 PROCEDURE — 84484 ASSAY OF TROPONIN QUANT: CPT

## 2019-08-23 PROCEDURE — 96368 THER/DIAG CONCURRENT INF: CPT | Mod: 59

## 2019-08-23 PROCEDURE — 81001 URINALYSIS AUTO W/SCOPE: CPT

## 2019-08-23 PROCEDURE — S0030 INJECTION, METRONIDAZOLE: HCPCS | Performed by: STUDENT IN AN ORGANIZED HEALTH CARE EDUCATION/TRAINING PROGRAM

## 2019-08-23 PROCEDURE — 96366 THER/PROPH/DIAG IV INF ADDON: CPT

## 2019-08-23 PROCEDURE — 80329 ANALGESICS NON-OPIOID 1 OR 2: CPT

## 2019-08-23 PROCEDURE — 84100 ASSAY OF PHOSPHORUS: CPT

## 2019-08-23 PROCEDURE — 93005 ELECTROCARDIOGRAM TRACING: CPT | Mod: 59

## 2019-08-23 PROCEDURE — 82140 ASSAY OF AMMONIA: CPT

## 2019-08-23 PROCEDURE — 99291 CRITICAL CARE FIRST HOUR: CPT | Mod: ,,, | Performed by: PSYCHIATRY & NEUROLOGY

## 2019-08-23 PROCEDURE — 87070 CULTURE OTHR SPECIMN AEROBIC: CPT

## 2019-08-23 PROCEDURE — 84443 ASSAY THYROID STIM HORMONE: CPT

## 2019-08-23 PROCEDURE — 85027 COMPLETE CBC AUTOMATED: CPT

## 2019-08-23 PROCEDURE — 80053 COMPREHEN METABOLIC PANEL: CPT

## 2019-08-23 PROCEDURE — 99291 PR CRITICAL CARE, E/M 30-74 MINUTES: ICD-10-PCS | Mod: ,,, | Performed by: EMERGENCY MEDICINE

## 2019-08-23 PROCEDURE — 96365 THER/PROPH/DIAG IV INF INIT: CPT | Mod: 59

## 2019-08-23 PROCEDURE — 82550 ASSAY OF CK (CPK): CPT

## 2019-08-23 PROCEDURE — 81000 URINALYSIS NONAUTO W/SCOPE: CPT | Mod: 59

## 2019-08-23 PROCEDURE — 25000003 PHARM REV CODE 250: Performed by: NURSE PRACTITIONER

## 2019-08-23 PROCEDURE — 96375 TX/PRO/DX INJ NEW DRUG ADDON: CPT | Mod: 59

## 2019-08-23 PROCEDURE — 83605 ASSAY OF LACTIC ACID: CPT | Mod: 91

## 2019-08-23 PROCEDURE — 63600175 PHARM REV CODE 636 W HCPCS: Performed by: STUDENT IN AN ORGANIZED HEALTH CARE EDUCATION/TRAINING PROGRAM

## 2019-08-23 PROCEDURE — 85007 BL SMEAR W/DIFF WBC COUNT: CPT

## 2019-08-23 PROCEDURE — 82962 GLUCOSE BLOOD TEST: CPT

## 2019-08-23 PROCEDURE — S0077 INJECTION, CLINDAMYCIN PHOSP: HCPCS | Performed by: EMERGENCY MEDICINE

## 2019-08-23 RX ORDER — SODIUM CHLORIDE 0.9 % (FLUSH) 0.9 %
10 SYRINGE (ML) INJECTION
Status: DISCONTINUED | OUTPATIENT
Start: 2019-08-23 | End: 2019-08-26 | Stop reason: HOSPADM

## 2019-08-23 RX ORDER — DEXMEDETOMIDINE HYDROCHLORIDE 4 UG/ML
0.2 INJECTION, SOLUTION INTRAVENOUS CONTINUOUS
Status: DISCONTINUED | OUTPATIENT
Start: 2019-08-23 | End: 2019-08-23

## 2019-08-23 RX ORDER — CHLORHEXIDINE GLUCONATE ORAL RINSE 1.2 MG/ML
15 SOLUTION DENTAL 2 TIMES DAILY
Status: DISCONTINUED | OUTPATIENT
Start: 2019-08-23 | End: 2019-08-26 | Stop reason: HOSPADM

## 2019-08-23 RX ORDER — PROPOFOL 10 MG/ML
INJECTION, EMULSION INTRAVENOUS
Status: COMPLETED
Start: 2019-08-23 | End: 2019-08-23

## 2019-08-23 RX ORDER — METRONIDAZOLE 500 MG/100ML
500 INJECTION, SOLUTION INTRAVENOUS
Status: DISCONTINUED | OUTPATIENT
Start: 2019-08-23 | End: 2019-08-25

## 2019-08-23 RX ORDER — DEXTROSE 50 % IN WATER (D50W) INTRAVENOUS SYRINGE
Status: COMPLETED
Start: 2019-08-23 | End: 2019-08-23

## 2019-08-23 RX ORDER — CEFTRIAXONE 1 G/1
1 INJECTION, POWDER, FOR SOLUTION INTRAMUSCULAR; INTRAVENOUS
Status: DISCONTINUED | OUTPATIENT
Start: 2019-08-23 | End: 2019-08-23 | Stop reason: SDUPTHER

## 2019-08-23 RX ORDER — LIDOCAINE HYDROCHLORIDE 10 MG/ML
1 INJECTION, SOLUTION EPIDURAL; INFILTRATION; INTRACAUDAL; PERINEURAL ONCE
Status: DISCONTINUED | OUTPATIENT
Start: 2019-08-23 | End: 2019-08-26 | Stop reason: HOSPADM

## 2019-08-23 RX ORDER — FENTANYL CITRATE 50 UG/ML
50 INJECTION, SOLUTION INTRAMUSCULAR; INTRAVENOUS ONCE
Status: COMPLETED | OUTPATIENT
Start: 2019-08-23 | End: 2019-08-23

## 2019-08-23 RX ORDER — PROPOFOL 10 MG/ML
20 INJECTION, EMULSION INTRAVENOUS
Status: COMPLETED | OUTPATIENT
Start: 2019-08-23 | End: 2019-08-23

## 2019-08-23 RX ORDER — NALOXONE HCL 0.4 MG/ML
VIAL (ML) INJECTION
Status: COMPLETED
Start: 2019-08-23 | End: 2019-08-23

## 2019-08-23 RX ORDER — ROCURONIUM BROMIDE 10 MG/ML
INJECTION, SOLUTION INTRAVENOUS
Status: COMPLETED
Start: 2019-08-23 | End: 2019-08-23

## 2019-08-23 RX ORDER — METRONIDAZOLE 500 MG/1
500 TABLET ORAL
Status: DISCONTINUED | OUTPATIENT
Start: 2019-08-23 | End: 2019-08-23 | Stop reason: SDUPTHER

## 2019-08-23 RX ORDER — CLINDAMYCIN PHOSPHATE 900 MG/50ML
900 INJECTION, SOLUTION INTRAVENOUS
Status: COMPLETED | OUTPATIENT
Start: 2019-08-23 | End: 2019-08-23

## 2019-08-23 RX ORDER — PROPOFOL 10 MG/ML
40 VIAL (ML) INTRAVENOUS ONCE
Status: DISCONTINUED | OUTPATIENT
Start: 2019-08-23 | End: 2019-08-23 | Stop reason: HOSPADM

## 2019-08-23 RX ORDER — HEPARIN SODIUM 5000 [USP'U]/ML
5000 INJECTION, SOLUTION INTRAVENOUS; SUBCUTANEOUS EVERY 8 HOURS
Status: DISCONTINUED | OUTPATIENT
Start: 2019-08-23 | End: 2019-08-26 | Stop reason: HOSPADM

## 2019-08-23 RX ORDER — ETOMIDATE 2 MG/ML
INJECTION INTRAVENOUS
Status: DISCONTINUED
Start: 2019-08-23 | End: 2019-08-23 | Stop reason: HOSPADM

## 2019-08-23 RX ORDER — DOPAMINE HYDROCHLORIDE 160 MG/100ML
5 INJECTION, SOLUTION INTRAVENOUS CONTINUOUS
Status: DISCONTINUED | OUTPATIENT
Start: 2019-08-23 | End: 2019-08-23 | Stop reason: HOSPADM

## 2019-08-23 RX ORDER — SUCCINYLCHOLINE CHLORIDE 20 MG/ML
INJECTION INTRAMUSCULAR; INTRAVENOUS
Status: DISCONTINUED
Start: 2019-08-23 | End: 2019-08-23 | Stop reason: HOSPADM

## 2019-08-23 RX ORDER — FENTANYL CITRATE 50 UG/ML
50 INJECTION, SOLUTION INTRAMUSCULAR; INTRAVENOUS
Status: COMPLETED | OUTPATIENT
Start: 2019-08-23 | End: 2019-08-23

## 2019-08-23 RX ORDER — SODIUM CHLORIDE 9 MG/ML
INJECTION, SOLUTION INTRAVENOUS CONTINUOUS
Status: DISCONTINUED | OUTPATIENT
Start: 2019-08-23 | End: 2019-08-23

## 2019-08-23 RX ORDER — FENTANYL CITRATE-0.9 % NACL/PF 10 MCG/ML
50 PLASTIC BAG, INJECTION (ML) INTRAVENOUS CONTINUOUS
Status: DISCONTINUED | OUTPATIENT
Start: 2019-08-23 | End: 2019-08-24

## 2019-08-23 RX ORDER — FENTANYL CITRATE 50 UG/ML
INJECTION, SOLUTION INTRAMUSCULAR; INTRAVENOUS
Status: COMPLETED
Start: 2019-08-23 | End: 2019-08-23

## 2019-08-23 RX ORDER — AMOXICILLIN 250 MG
1 CAPSULE ORAL DAILY
Status: DISCONTINUED | OUTPATIENT
Start: 2019-08-24 | End: 2019-08-26 | Stop reason: HOSPADM

## 2019-08-23 RX ORDER — FENTANYL CITRATE 50 UG/ML
INJECTION, SOLUTION INTRAMUSCULAR; INTRAVENOUS
Status: DISCONTINUED
Start: 2019-08-23 | End: 2019-08-23 | Stop reason: HOSPADM

## 2019-08-23 RX ORDER — FAMOTIDINE 10 MG/ML
20 INJECTION INTRAVENOUS DAILY
Status: DISCONTINUED | OUTPATIENT
Start: 2019-08-24 | End: 2019-08-25

## 2019-08-23 RX ORDER — DEXMEDETOMIDINE HYDROCHLORIDE 4 UG/ML
INJECTION, SOLUTION INTRAVENOUS
Status: COMPLETED
Start: 2019-08-23 | End: 2019-08-23

## 2019-08-23 RX ORDER — POTASSIUM CHLORIDE 7.45 MG/ML
10 INJECTION INTRAVENOUS
Status: COMPLETED | OUTPATIENT
Start: 2019-08-23 | End: 2019-08-23

## 2019-08-23 RX ORDER — DEXTROSE 50 % IN WATER (D50W) INTRAVENOUS SYRINGE
25
Status: DISCONTINUED | OUTPATIENT
Start: 2019-08-23 | End: 2019-08-23 | Stop reason: HOSPADM

## 2019-08-23 RX ORDER — SODIUM CHLORIDE 9 MG/ML
500 INJECTION, SOLUTION INTRAVENOUS
Status: COMPLETED | OUTPATIENT
Start: 2019-08-23 | End: 2019-08-23

## 2019-08-23 RX ADMIN — DOPAMINE HYDROCHLORIDE 5 MCG/KG/MIN: 160 INJECTION, SOLUTION INTRAVENOUS at 11:08

## 2019-08-23 RX ADMIN — DEXTROSE MONOHYDRATE 50 ML: 500 INJECTION PARENTERAL at 01:08

## 2019-08-23 RX ADMIN — DEXMEDETOMIDINE HYDROCHLORIDE 0.2 MCG/KG/HR: 4 INJECTION, SOLUTION INTRAVENOUS at 02:08

## 2019-08-23 RX ADMIN — PROPOFOL 20 MCG/KG/MIN: 10 INJECTION, EMULSION INTRAVENOUS at 09:08

## 2019-08-23 RX ADMIN — NALOXONE HYDROCHLORIDE 2 MG: 0.4 INJECTION, SOLUTION INTRAMUSCULAR; INTRAVENOUS; SUBCUTANEOUS at 08:08

## 2019-08-23 RX ADMIN — HEPARIN SODIUM 5000 UNITS: 5000 INJECTION, SOLUTION INTRAVENOUS; SUBCUTANEOUS at 10:08

## 2019-08-23 RX ADMIN — SODIUM CHLORIDE 500 ML: 0.9 INJECTION, SOLUTION INTRAVENOUS at 05:08

## 2019-08-23 RX ADMIN — FENTANYL CITRATE 25 MCG: 50 INJECTION INTRAMUSCULAR; INTRAVENOUS at 06:08

## 2019-08-23 RX ADMIN — DEXTROSE 250 ML: 10 SOLUTION INTRAVENOUS at 03:08

## 2019-08-23 RX ADMIN — METRONIDAZOLE 500 MG: 500 INJECTION, SOLUTION INTRAVENOUS at 11:08

## 2019-08-23 RX ADMIN — CLINDAMYCIN IN 5 PERCENT DEXTROSE 900 MG: 18 INJECTION, SOLUTION INTRAVENOUS at 10:08

## 2019-08-23 RX ADMIN — POTASSIUM CHLORIDE 10 MEQ: 7.46 INJECTION, SOLUTION INTRAVENOUS at 11:08

## 2019-08-23 RX ADMIN — CEFTRIAXONE SODIUM 2 G: 2 INJECTION, POWDER, FOR SOLUTION INTRAMUSCULAR; INTRAVENOUS at 03:08

## 2019-08-23 RX ADMIN — SODIUM CHLORIDE 500 ML: 0.9 INJECTION, SOLUTION INTRAVENOUS at 11:08

## 2019-08-23 RX ADMIN — METRONIDAZOLE 500 MG: 500 INJECTION, SOLUTION INTRAVENOUS at 04:08

## 2019-08-23 RX ADMIN — FENTANYL CITRATE 50 MCG: 50 INJECTION, SOLUTION INTRAMUSCULAR; INTRAVENOUS at 06:08

## 2019-08-23 RX ADMIN — Medication 50 MCG/HR: at 07:08

## 2019-08-23 RX ADMIN — FENTANYL CITRATE 50 MCG: 50 INJECTION INTRAMUSCULAR; INTRAVENOUS at 06:08

## 2019-08-23 RX ADMIN — FENTANYL CITRATE 50 MCG: 50 INJECTION, SOLUTION INTRAMUSCULAR; INTRAVENOUS at 11:08

## 2019-08-23 RX ADMIN — ROCURONIUM BROMIDE 60 MG: 10 INJECTION, SOLUTION INTRAVENOUS at 09:08

## 2019-08-23 NOTE — ED NOTES
Family at bedside. SR up. On cardiac monitor. Ventilator in place. O2 sat 92% on vent, VS stable. Stack draining clear yellow urine. Will continue to monitor. [facility] ambulance is on the way for transfer to [facility].

## 2019-08-23 NOTE — ED NOTES
Dr. Marlow spoke to Transfer center for pt transfer to [facility] for further Neuro care.

## 2019-08-23 NOTE — H&P
[facility]  Neurocritical Care  History & Physical    Admit Date: 8/23/2019  Service Date: 08/23/2019  Length of Stay: 0    Subjective:     Chief Complaint: Status epilepticus    History of Present Illness: Mr. Alka Rowe is a 24 yo M with hx of seizures (non-compliant with meds) who presents as a transfer from [facility] for status epilepticus(?). Per chart review and ED record, patient was out partying last night and family found him unresponsive in the morning. 911 was called but no ambulance showed up so family took him to [place] ED. Pt was hypoxic to 70% on arrival and no response to narcan. He was intubated and started on propofol drip to evaluate for ICH. CTH revealed mild diffuse edema. CT chest revealed multifocal pneumonia. Labs revealed leukocytosis of 14.4, lactic acid 11.2, and utox positive for opiates and cocaine. Glucose was in the 60s, he received D50 with improvement to the 130s. Pt was transferred to [facility] intubated and on propofol and dobutamine. Admitted to [facility] for higher level of care.     Past Medical History:   Diagnosis Date    Renal disorder     Seizures      Past Surgical History:   Procedure Laterality Date    HAND SURGERY         Current Facility-Administered Medications on File Prior to Encounter   Medication Dose Route Frequency Provider Last Rate Last Dose    [COMPLETED] 0.9%  NaCl infusion  500 mL Intravenous ED 1 Time Kemar Marlow Jr., MD   Stopped at 08/23/19 1214    [COMPLETED] clindamycin 900 MG/50 ML D5W 900 mg/50 mL IVPB 900 mg  900 mg Intravenous ED 1 Time Kemar Marlow Jr., MD   Stopped at 08/23/19 1144    [COMPLETED] dextrose 50 % in water (D50W) injection        50 mL at 08/23/19 1324    [COMPLETED] fentaNYL injection 50 mcg  50 mcg Intravenous ED 1 Time Kemar Marlow Jr., MD   50 mcg at 08/23/19 1134    [COMPLETED] naloxone (NARCAN) 0.4 mg/mL injection        2 mg at 08/23/19 0854    [COMPLETED] naloxone (NARCAN) 0.4 mg/mL  injection        2 mg at 08/23/19 0850    [COMPLETED] potassium chloride 10 mEq in 100 mL IVPB  10 mEq Intravenous ED 1 Time Kemar Marlow Jr., MD   Stopped at 08/23/19 1216    [COMPLETED] propofol (DIPRIVAN) 10 mg/mL infusion  20 mcg/kg/min Intravenous ED 1 Time Kemar Marlow Jr., MD 1.7 mL/hr at 08/23/19 1257 5 mcg/kg/min at 08/23/19 1257    [COMPLETED] rocuronium (ZEMURON) 10 mg/mL injection        60 mg at 08/23/19 0900    [DISCONTINUED] dextrose 50 % in water (D50W) injection 25 g  25 g Intravenous ED 1 Time Kemar Marlow Jr., MD        [DISCONTINUED] DOPamine 400 mg in dextrose 5 % 250 mL infusion (premix)  5 mcg/kg/min Intravenous Continuous Kemar Marlow Jr., MD 31.9 mL/hr at 08/23/19 1158 15 mcg/kg/min at 08/23/19 1158    [DISCONTINUED] etomidate (AMIDATE) 2 mg/mL injection             [DISCONTINUED] propofol (DIPRIVAN) 10 mg/mL IVP  40 mg Intravenous Once Kemar Marlow Jr., MD   Stopped at 08/23/19 1030    [DISCONTINUED] sodium chloride 0.9% bolus 1,000 mL  1,000 mL Intravenous ED 1 Time Kemar Marlow Jr., MD        [DISCONTINUED] sodium chloride 0.9% bolus 500 mL  500 mL Intravenous ED 1 Time Kemar Marlow Jr., MD        [DISCONTINUED] succinylcholine (ANECTINE) 20 mg/mL injection              Current Outpatient Medications on File Prior to Encounter   Medication Sig Dispense Refill    [DISCONTINUED] ibuprofen (ADVIL,MOTRIN) 600 MG tablet Take 1 tablet (600 mg total) by mouth every 6 (six) hours as needed for Pain. 20 tablet 0     Allergies: Patient has no known allergies.    History reviewed. No pertinent family history.    Social History     Tobacco Use    Smoking status: Current Every Day Smoker     Packs/day: 0.50     Types: Cigarettes    Smokeless tobacco: Never Used   Substance Use Topics    Alcohol use: No    Drug use: Yes     Types: Marijuana, Cocaine      Review of Systems: Unable to obtain a complete ROS due to intubation.    Vitals:   Temp: 98 °F (36.7  °C)  Pulse: 99  BP: (!) 85/52  MAP (mmHg): 64  Resp: 15  SpO2: 96 %  Oxygen Concentration (%): 75  O2 Device (Oxygen Therapy): ventilator  Vent Mode: Spont  Set Rate: 0 bmp  Vt Set: 400 mL  Pressure Support: 8 cmH20  PEEP/CPAP: 6 cmH20  Peak Airway Pressure: 14 cmH2O  Mean Airway Pressure: 9.2 cmH20  Plateau Pressure: 0 cmH20    Temp  Min: 97 °F (36.1 °C)  Max: 98 °F (36.7 °C)  Pulse  Min: 99  Max: 141  BP  Min: 69/43  Max: 181/107  MAP (mmHg)  Min: 52  Max: 137  Resp  Min: 0  Max: 32  SpO2  Min: 89 %  Max: 100 %  Oxygen Concentration (%)  Min: 50  Max: 80    No intake/output data recorded.         Examination:   Constitutional: Well-nourished and -developed. Appears distressed.  Eyes: Conjunctiva clear, anicteric. Lids no lesions.  Head/Ears/Nose/Mouth/Throat/Neck: Moist mucous membranes. External ears, nose atraumatic.   Cardiovascular: Tachycardic. Regular rhythm. No leg edema.  Respiratory: Intubated.   Vent Mode: Spont  Oxygen Concentration (%):  [50-80] 75  Resp Rate Total:  [21 br/min-31 br/min] 24 br/min  Vt Set:  [400 mL] 400 mL  PEEP/CPAP:  [5 cmH20-6 cmH20] 6 cmH20  Pressure Support:  [8 cmH20] 8 cmH20  Mean Airway Pressure:  [9.2 ofN26-50 cmH20] 9.2 cmH20  Gastrointestinal: Soft, nondistended, nontender.  Skin: warm and dry.    Neurologic:   -E4 V1T M6  -Alert. Intubated and agitated. Nods appropriately to questions. Follows commands.   -PERRL  -Moves all 4 extremities to command.   -UE hand  5/5 and symmetric  -Tone normal.   -Sensation intact to touch in arms, legs.    Today I independently reviewed pertinent medications, lines/drains/airways, imaging, cardiology results, laboratory results, microbiology results,      Assessment/Plan:     Neuro  * Status epilepticus  Presented to [facility]/ [facility], unresponsive, resp distress  -utox positive for cocaine and opioids (not responsive to narcan)  -CTH with cerebral edema  -?questionable seizures  -intubated at [facility] and transferred to  [facility]  --neurochecks/vitals q1h  --SBP goal <180  --maintenance fluids @ 75cc  --d/c propofol    Psychiatric  Opiate abuse, continuous  Positive tox screen for opioids  -not responsive to narcan on arrival    Cocaine abuse  Positive tox screen for cocaine  -troponins, CPK  -obtain Echo  -avoid BB    Pulmonary  Acute respiratory failure with hypoxia  Intubated @ OSH   -CT chest with multifocal pneumonia  -CXR with diffuse opacification in R lung  --daily CXR, ABG  --VAP precautions  --empiric abx ceftriaxone and flagyl  --f/u cultures    Vent Mode: Spont  Oxygen Concentration (%):  [50-80] 75  Resp Rate Total:  [21 br/min-31 br/min] 24 br/min  Vt Set:  [400 mL] 400 mL  PEEP/CPAP:  [5 cmH20-6 cmH20] 6 cmH20  Pressure Support:  [8 cmH20] 8 cmH20  Mean Airway Pressure:  [9.2 mbK51-30 cmH20] 9.2 cmH20          The patient is being Prophylaxed for:  Venous Thromboembolism with: Mechanical or Chemical  Stress Ulcer with: H2B  Ventilator Pneumonia with: chlorhexidine oral care    Activity Orders          None        Full Code    Herbert Borja MD  Neurocritical Care  [facility]

## 2019-08-23 NOTE — HPI
Mr. Alka Rowe is a 22 yo M with hx of seizures (non-compliant with meds) who presents as a transfer from [facility] for status epilepticus(?). Per chart review and ED record, patient was out partying last night and family found him unresponsive in the morning. 911 was called but no ambulance showed up so family took him to [place] ED. Pt was hypoxic to 70% on arrival and no response to narcan. He was intubated and started on propofol drip to evaluate for ICH. CTH revealed mild diffuse edema. CT chest revealed multifocal pneumonia. Labs revealed leukocytosis of 14.4, lactic acid 11.2, and utox positive for opiates and cocaine. Glucose was in the 60s, he received D50 with improvement to the 130s. Pt was transferred to [facility] intubated and on propofol and dobutamine. Admitted to [facility] for higher level of care.

## 2019-08-23 NOTE — HOSPITAL COURSE
08/23/2019: admitted to [facility]   08/24 transfer to medicine, troponin pending and echo read pending and cards verbally oked transfer

## 2019-08-23 NOTE — ASSESSMENT & PLAN NOTE
Intubated @ OSH   -CT chest with multifocal pneumonia  -CXR with diffuse opacification in R lung  --daily CXR, ABG  --VAP precautions  --empiric abx ceftriaxone and flagyl  --f/u cultures    Vent Mode: Spont  Oxygen Concentration (%):  [50-80] 75  Resp Rate Total:  [21 br/min-31 br/min] 24 br/min  Vt Set:  [400 mL] 400 mL  PEEP/CPAP:  [5 cmH20-6 cmH20] 6 cmH20  Pressure Support:  [8 cmH20] 8 cmH20  Mean Airway Pressure:  [9.2 sjT54-81 cmH20] 9.2 cmH20

## 2019-08-23 NOTE — ED NOTES
Patient transferred from [facility] for being found unresponsive this morning by family and agonal respirations.  Patient was +for ETOH, cocaine and opiod use.  Patient arrived intubated, on propofal at 5mcg/kg/min and dopamine at 15mcg/kg/min.  RT at bedside.  Dr. Warner at bedside

## 2019-08-23 NOTE — ED PROVIDER NOTES
Encounter Date: 8/23/2019    SCRIBE #1 NOTE: I, Jessika Lam, am scribing for, and in the presence of,  Dr. Marlow. I have scribed the entire note.       History     Chief Complaint   Patient presents with    unresponsive     Patient found unresponsive by family.  Agonal breathing with pinpoint breathing on arrival      Alka Rowe is a 23 y.o. male who  has a past medical history of Renal disorder and Seizures.    The patient presents to the ED due to unresponsiveness. Patient's family reports he was unresponsive this morning when they tried to wake him. Family states patient was out drinking alcohol and intoxicated last night. Per family, he was last responsive and speaking around 5 AM. Family is unsure how long he has been unresponsive. Family reports he smokes marijuana and drinks alcohol, but unsure of any other drugs. He has history of acute kidney failure and seizures. Family reports he is not on any medication for his seizures and is noncompliant with any other medication if he is given any. History is limited due to condition of patient.     The history is provided by a relative. The history is limited by the condition of the patient.     Review of patient's allergies indicates:  No Known Allergies  Past Medical History:   Diagnosis Date    Renal disorder     Seizures      Past Surgical History:   Procedure Laterality Date    HAND SURGERY       No family history on file.  Social History     Tobacco Use    Smoking status: Current Every Day Smoker     Packs/day: 0.50     Types: Cigarettes    Smokeless tobacco: Never Used   Substance Use Topics    Alcohol use: No    Drug use: No     Types: Marijuana     Review of Systems   Unable to perform ROS: Patient unresponsive       Physical Exam     Initial Vitals   BP Pulse Resp Temp SpO2   08/23/19 1007 08/23/19 1007 08/23/19 1007 08/23/19 1058 08/23/19 1007   (!) 171/99 (!) 116 (!) 22 97 °F (36.1 °C) (!) 89 %      MAP       --                Physical  Exam    Nursing note and vitals reviewed.  Constitutional: He appears well-developed and well-nourished.   Patient is unresponsive.    HENT:   Head: Normocephalic and atraumatic.   Clinched jaw.  No evidence of posturing   Eyes: Conjunctivae are normal.   1-2 mm pupils.   Pupils are pinpoint.    Neck: Normal range of motion. Neck supple.   Cardiovascular: Regular rhythm, normal heart sounds and intact distal pulses. Exam reveals no gallop and no friction rub.    No murmur heard.  Pulmonary/Chest: He has rales.   Shallow respirations.    Abdominal: Soft.   Musculoskeletal:   No deformity noted   Lymphadenopathy:     He has no cervical adenopathy.   Neurological:   GCS 3   Skin: Capillary refill takes 2 to 3 seconds. No rash noted.         ED Course   Intubation  Date/Time: 8/23/2019 9:09 AM  Performed by: Kemar Marlow Jr., MD  Authorized by: Kemar Marlow Jr., MD   Consent Done: Emergent Situation  Indications: respiratory failure and  airway protection  Intubation method: direct  Patient status: paralyzed (RSI) (Given rocuronium only)  Preoxygenation: nonrebreather mask and mask  Paralytic: rocuronium  Laryngoscope size: Mac 4  Tube size: 7.5 mm  Tube type: cuffed  Number of attempts: 1  Post-procedure assessment: chest rise and ETCO2 monitor  Breath sounds: absent over the epigastrium  Cuff inflated: yes  ETT to lip: 23 cm    Critical Care  Date/Time: 8/23/2019 9:44 AM  Performed by: Jessika Lam  Authorized by: Kemar Marlow Jr., MD   Direct patient critical care time: 15 minutes  Additional history critical care time: 5 minutes  Ordering / reviewing critical care time: 5 minutes  Documentation critical care time: 5 minutes  Consulting other physicians critical care time: 5 minutes  Consult with family critical care time: 5 minutes  Total critical care time (exclusive of procedural time) : 40 minutes  Critical care time was exclusive of separately billable procedures and treating other patients and  teaching time.  Critical care was necessary to treat or prevent imminent or life-threatening deterioration of the following conditions: respiratory failure, CNS failure or compromise and toxidrome.  Critical care was time spent personally by me on the following activities: pulse oximetry, discussions with consultants, evaluation of patient's response to treatment, examination of patient, obtaining history from patient or surrogate, ordering and performing treatments and interventions, ordering and review of laboratory studies, ordering and review of radiographic studies, review of old charts, development of treatment plan with patient or surrogate and re-evaluation of patient's condition.        Labs Reviewed   CBC W/ AUTO DIFFERENTIAL - Abnormal; Notable for the following components:       Result Value    WBC 14.47 (*)     Mean Corpuscular Hemoglobin Conc 31.8 (*)     Mono% 3.0 (*)     All other components within normal limits   COMPREHENSIVE METABOLIC PANEL - Abnormal; Notable for the following components:    Potassium 3.1 (*)     CO2 14 (*)     Glucose 422 (*)     Creatinine 2.0 (*)     Calcium 8.5 (*)      (*)      (*)     Anion Gap 22 (*)     eGFR if  53 (*)     eGFR if non  46 (*)     All other components within normal limits   URINALYSIS, REFLEX TO URINE CULTURE - Abnormal; Notable for the following components:    Protein, UA 1+ (*)     Glucose, UA 3+ (*)     Occult Blood UA 1+ (*)     All other components within normal limits    Narrative:     Preferred Collection Type->Urine, Clean Catch   DRUG SCREEN PANEL, URINE EMERGENCY - Abnormal; Notable for the following components:    Creatinine, Random Ur 7.1 (*)     All other components within normal limits    Narrative:     Preferred Collection Type->Urine, Clean Catch   ALCOHOL,MEDICAL (ETHANOL) - Abnormal; Notable for the following components:    Alcohol, Medical, Serum 128 (*)     All other components within normal  limits   ACETAMINOPHEN LEVEL - Abnormal; Notable for the following components:    Acetaminophen (Tylenol), Serum <3.0 (*)     All other components within normal limits   SALICYLATE LEVEL - Abnormal; Notable for the following components:    Salicylate Lvl <5.0 (*)     All other components within normal limits   LACTIC ACID, PLASMA - Abnormal; Notable for the following components:    Lactate (Lactic Acid) 11.2 (*)     All other components within normal limits    Narrative:     LACT  critical result(s) called and verbal readback obtained from SATISH Wei RN, 08/23/2019 09:58   CK - Abnormal; Notable for the following components:     (*)     All other components within normal limits   PHOSPHORUS - Abnormal; Notable for the following components:    Phosphorus 8.4 (*)     All other components within normal limits   AMMONIA - Abnormal; Notable for the following components:    Ammonia 129 (*)     All other components within normal limits   POCT GLUCOSE - Abnormal; Notable for the following components:    POCT Glucose 304 (*)     All other components within normal limits   TSH   MAGNESIUM   PROTIME-INR   URINALYSIS MICROSCOPIC    Narrative:     Preferred Collection Type->Urine, Clean Catch   BETA - HYDROXYBUTYRATE, SERUM   TROPONIN I   POCT GLUCOSE MONITORING CONTINUOUS        ECG Results          EKG 12-lead (Final result)  Result time 08/23/19 10:15:30    Final result by Interface, [facility] (08/23/19 10:15:30)                 Narrative:    Test Reason : Z00.8,    Vent. Rate : 114 BPM     Atrial Rate : 114 BPM     P-R Int : 170 ms          QRS Dur : 104 ms      QT Int : 314 ms       P-R-T Axes : 081 101 -27 degrees     QTc Int : 432 ms    Sinus tachycardia  Right atrial enlargement  Rightward axis  right ventricular hypertrophy   Nonspecific T wave abnormality  Abnormal ECG  When compared with ECG of 09-FEB-2019 20:50,  Right atrial enlargement  RVH noted   Confirmed by Evan ARAIZA, Pedro ([id number]) on  8/23/2019 10:15:23 AM    Referred By: [email or web address]   SELF           Confirmed By:Pedro Gordon MD                            Imaging Results          X-Ray Abdomen Portable (Final result)  Result time 08/23/19 10:51:39    Final result by Bob Freitas MD (08/23/19 10:51:39)                 Impression:      NG tube in place.      Electronically signed by: Bob Freitas MD  Date:    08/23/2019  Time:    10:51             Narrative:    EXAMINATION:  XR ABDOMEN PORTABLE    CLINICAL HISTORY:  Other specified health status    TECHNIQUE:  Single portable AP View of the abdomen was performed.    COMPARISON:  None    FINDINGS:  Multiple overlying cardiac monitoring leads. NG tube in place with the tip in proximal port in good position projecting over left upper quadrant of the abdomen.  Bowel gas pattern appears nonobstructive.  No dilated loops of bowel identified.  No obvious free air.Known consolidative change in the right lung.                               CT Thorax Without Contrast (Final result)  Result time 08/23/19 10:29:59    Final result by Andrew Florez MD (08/23/19 10:29:59)                 Impression:      Significant consolidation and patchy opacities throughout the right lobe and in the left lower lobe.  This may be seen with aspiration or multifocal pneumonia.      Electronically signed by: Andrew Florez MD  Date:    08/23/2019  Time:    10:29             Narrative:    EXAMINATION:  CT CHEST WITHOUT CONTRAST    CLINICAL HISTORY:  Aspiration, known or suspected;    TECHNIQUE:  Low dose axial images, sagittal and coronal reformations were obtained from the thoracic inlet to the lung bases. Contrast was not administered.    COMPARISON:  None    FINDINGS:  Base of Neck: No significant abnormality.    Thoracic soft tissues: Normal.    Aorta: Left-sided aortic arch.  No aneurysm and no significant atherosclerosis    Heart: Normal size. No effusion.    Caron/Mediastinum: No pathologic tobias  enlargement.    Airways: Endotracheal tube is visualized with its tip 3.5 cm above the toya.  Trachea and bronchi are patent.    Lungs/Pleura: There is significant consolidation and patchy opacities predominantly in the right lower lobe as well as the right middle and right upper lobe as well as the left lower lobe.  Findings are suggestive of aspiration or pneumonia.    Esophagus: Normal.    Upper Abdomen: No abnormality of the partially imaged upper abdomen.    Bones: No acute fracture. No suspicious lytic or sclerotic lesions.                                CT Head Without Contrast (Final result)  Result time 08/23/19 10:10:17    Final result by Bob Freitas MD (08/23/19 10:10:17)                 Impression:      Slightly diminished sulcal definition relative to the prior study of 06/30/2015.  While possibly related to variation in technique, the appearance suggests mild diffuse cerebral edema.    No focal parenchymal abnormality identified.  Gray-white differentiation is preserved.    Clinical correlation advised with follow-up as warranted.    This report was flagged in Epic as abnormal.      Electronically signed by: Bob Freitas MD  Date:    08/23/2019  Time:    10:10             Narrative:    EXAMINATION:  CT HEAD WITHOUT CONTRAST    CLINICAL HISTORY:  Confusion/delirium, altered LOC, unexplained;    TECHNIQUE:  Low dose axial CT images obtained throughout the head without the use of intravenous contrast.  Axial, sagittal and coronal reconstructions were performed.    COMPARISON:  06/30/2015    FINDINGS:  Intracranial compartment:    The ventricles are stable in size, without evidence of hydrocephalus.  However, the cerebral sulci are slightly less well defined than on the prior examination of 2015.  Appearance concerning for some component of diffuse cerebral edema.    No focal brain parenchymal lesions are identified.  Gray-white differentiation remains well preserved.  No mass or hemorrhage.  No  recent or remote major vascular distribution infarct.    No extra-axial blood or fluid collections.    Skull/extracranial contents (limited evaluation):    No fracture. Mastoid air cells and paranasal sinuses are essentially clear.                                X-Ray Chest 1 View (Final result)  Result time 08/23/19 09:41:18    Final result by Ras Zarate Jr., MD (08/23/19 09:41:18)                 Impression:      Near complete opacification of the right lung.  This may represent a pneumonic infiltrate.  Few patchy opacities left upper hilum and left lung base.    This report was flagged in Epic as abnormal.      Electronically signed by: Ras Zarate MD  Date:    08/23/2019  Time:    09:41             Narrative:    EXAMINATION:  XR CHEST 1 VIEW    CLINICAL HISTORY:  Presence of other specified devices    TECHNIQUE:  Single frontal view of the chest was performed.    COMPARISON:  None    FINDINGS:  ET tube above the toya.  Heart size pulmonary vessels are normal.  There is consolidation involving the majority of the right lung.  Some patchy parenchymal opacity in the left lung though certainly better aerated.  No pneumothorax.                                 Medical Decision Making:   Differential Diagnosis:   Differential Diagnosis includes, but is not limited to:  CVA/TIA, seizure, status epilepticus, post-ictal state, meningitis/encephalitis, sepsis, MI/ACS, arrhythmia, syncope, intracranial mass/hemorrhage, head trauma, anaphylaxis, substance abuse, alcohol intoxication/withdrawal, medication reaction, intentional medication overdose, neuroleptic malignant syndrome, serotonin syndrome, CO poisoning, hypoxia/hypercapnea, hepatic encephalopathy, metabolic disturbance, thyroid disease, hypoglycemia.    Clinical Tests:   Lab Tests: Ordered and Reviewed  Radiological Study: Ordered and Reviewed  Medical Tests: Ordered and Reviewed  ED Management:  Labs with elevated lactate leukocytosis and polysubstance  abuse.    Chest x-ray/CT with evidence of aspiration  Given IV clindamycin for coverage of aspiration pneumonia.    Patient was placed on propofol infusion and dopamine for pressure support.      Unclear etiology of symptoms. Given history of seizure physical findings including his jaw clenced and elevated lactate his AMS may represent status epilepitcus      Patient will be transferred to [facility] for neuro critical care given presence of cerebral edema  in for evaluation of status epilepticus                Attending Attestation:         Attending Critical Care:   Critical Care Times:   Consultation with other Physicians. .................................................................................................................................................5 minutes.               ED Course as of Aug 25 0653   Fri Aug 23, 2019   0950 This is a 23-year-old male who presents with altered mental status.  Patient initially had oxygen saturations in the 70s on room air.  After trial of Narcan patient remained unresponsive.  His pupils were equal and sluggish and approximately 1-2 mm.    Patient was given bagged via a nasal pharyngeal airway improvement of oxygen saturations.    Patient's jaw was clenched.  He did not appear to be able to protect his airway therefore we proceeded with endotracheal intubation.    Patient's family states patient was post be on seizure medications and that he had been drinking heavily the night before.  Initial differentials include tox/overdose intracranial hemorrhage and status epilepticus.    Patient is placed on propofol drip and will obtain CT to evaluate for intracranial hemorrhage.  Labs pending    [RN]   1025 Lactic acid, plasma(!!) [RN]   1025 POCT glucose(!) [RN]   1025 CPK(!) [RN]   1025 Acetaminophen level(!) [RN]   1025 Phosphorus(!) [RN]   1025 CPK(!) [RN]   1025 Comprehensive metabolic panel(!) [RN]   1025 Ethanol(!) [RN]   1025 Salicylate level(!) [RN]    1031 CT scan shows mild cerebral edema. Will talk with transfer center for transfer to facility of neuro critical care.    [RN]      ED Course User Index  [RN] Kemar Marlow Jr., MD     Clinical Impression:       ICD-10-CM ICD-9-CM   1. Seizure-like activity R56.9 780.39   2. Endotracheally intubated Z97.8 V49.89   3. Encounter for feeding tube placement Z78.9 V49.9   4. Lactic acidosis E87.2 276.2           Scribe attestation I, Kemar Marlow,  personally performed the services described in this documentation. All medical record entries made by the scribe were at my direction and in my presence.  I have reviewed the chart and agree that the record reflects my personal performance and is accurate and complete. Kemar Marlow M.D. 6:57 AM08/25/2019       Kemar Marlow Jr., MD  08/25/19 0657

## 2019-08-23 NOTE — ED NOTES
Patient noted to be diaphoretic.  Dr. Warner notified.  CBG performed and D10 infusion started per Dr. Warner

## 2019-08-23 NOTE — ED NOTES
Dr. Marlow at bedside to obtain ABG via left femoral stick. Bleeding controlled, pressure applied.

## 2019-08-23 NOTE — ASSESSMENT & PLAN NOTE
Presented to [facility], unresponsive, resp distress  -utox positive for cocaine and opioids  -not responsive to narcan  -CTH with cerebral edema  -?questionable SE  -intubated at OSH  --neurochecks/vitals q1h  --SBP goal <180  --maintenance fluids @ 75cc  --d/c propofol

## 2019-08-23 NOTE — SUBJECTIVE & OBJECTIVE
Past Medical History:   Diagnosis Date    Renal disorder     Seizures      Past Surgical History:   Procedure Laterality Date    HAND SURGERY         Current Facility-Administered Medications on File Prior to Encounter   Medication Dose Route Frequency Provider Last Rate Last Dose    [COMPLETED] 0.9%  NaCl infusion  500 mL Intravenous ED 1 Time Kemar Marlow Jr., MD   Stopped at 08/23/19 1214    [COMPLETED] clindamycin 900 MG/50 ML D5W 900 mg/50 mL IVPB 900 mg  900 mg Intravenous ED 1 Time Kemar Marlow Jr., MD   Stopped at 08/23/19 1144    [COMPLETED] dextrose 50 % in water (D50W) injection        50 mL at 08/23/19 1324    [COMPLETED] fentaNYL injection 50 mcg  50 mcg Intravenous ED 1 Time Kemar Marlow Jr., MD   50 mcg at 08/23/19 1134    [COMPLETED] naloxone (NARCAN) 0.4 mg/mL injection        2 mg at 08/23/19 0854    [COMPLETED] naloxone (NARCAN) 0.4 mg/mL injection        2 mg at 08/23/19 0850    [COMPLETED] potassium chloride 10 mEq in 100 mL IVPB  10 mEq Intravenous ED 1 Time Kemar Marlow Jr., MD   Stopped at 08/23/19 1216    [COMPLETED] propofol (DIPRIVAN) 10 mg/mL infusion  20 mcg/kg/min Intravenous ED 1 Time Kemar Marlow Jr., MD 1.7 mL/hr at 08/23/19 1257 5 mcg/kg/min at 08/23/19 1257    [COMPLETED] rocuronium (ZEMURON) 10 mg/mL injection        60 mg at 08/23/19 0900    [DISCONTINUED] dextrose 50 % in water (D50W) injection 25 g  25 g Intravenous ED 1 Time Kemar Marlow Jr., MD        [DISCONTINUED] DOPamine 400 mg in dextrose 5 % 250 mL infusion (premix)  5 mcg/kg/min Intravenous Continuous Kemar Marlow Jr., MD 31.9 mL/hr at 08/23/19 1158 15 mcg/kg/min at 08/23/19 1158    [DISCONTINUED] etomidate (AMIDATE) 2 mg/mL injection             [DISCONTINUED] propofol (DIPRIVAN) 10 mg/mL IVP  40 mg Intravenous Once Kemar Marlow Jr., MD   Stopped at 08/23/19 1030    [DISCONTINUED] sodium chloride 0.9% bolus 1,000 mL  1,000 mL Intravenous ED 1 Time Kemar Marlow Jr.,  MD        [DISCONTINUED] sodium chloride 0.9% bolus 500 mL  500 mL Intravenous ED 1 Time Kemar Marlow Jr., MD        [DISCONTINUED] succinylcholine (ANECTINE) 20 mg/mL injection              Current Outpatient Medications on File Prior to Encounter   Medication Sig Dispense Refill    [DISCONTINUED] ibuprofen (ADVIL,MOTRIN) 600 MG tablet Take 1 tablet (600 mg total) by mouth every 6 (six) hours as needed for Pain. 20 tablet 0     Allergies: Patient has no known allergies.    History reviewed. No pertinent family history.    Social History     Tobacco Use    Smoking status: Current Every Day Smoker     Packs/day: 0.50     Types: Cigarettes    Smokeless tobacco: Never Used   Substance Use Topics    Alcohol use: No    Drug use: Yes     Types: Marijuana, Cocaine      Review of Systems: Unable to obtain a complete ROS due to intubation.    Vitals:   Temp: 98 °F (36.7 °C)  Pulse: 99  BP: (!) 85/52  MAP (mmHg): 64  Resp: 15  SpO2: 96 %  Oxygen Concentration (%): 75  O2 Device (Oxygen Therapy): ventilator  Vent Mode: Spont  Set Rate: 0 bmp  Vt Set: 400 mL  Pressure Support: 8 cmH20  PEEP/CPAP: 6 cmH20  Peak Airway Pressure: 14 cmH2O  Mean Airway Pressure: 9.2 cmH20  Plateau Pressure: 0 cmH20    Temp  Min: 97 °F (36.1 °C)  Max: 98 °F (36.7 °C)  Pulse  Min: 99  Max: 141  BP  Min: 69/43  Max: 181/107  MAP (mmHg)  Min: 52  Max: 137  Resp  Min: 0  Max: 32  SpO2  Min: 89 %  Max: 100 %  Oxygen Concentration (%)  Min: 50  Max: 80    No intake/output data recorded.         Examination:   Constitutional: Well-nourished and -developed. Appears distressed.  Eyes: Conjunctiva clear, anicteric. Lids no lesions.  Head/Ears/Nose/Mouth/Throat/Neck: Moist mucous membranes. External ears, nose atraumatic.   Cardiovascular: Tachycardic. Regular rhythm. No leg edema.  Respiratory: Intubated.   Vent Mode: Spont  Oxygen Concentration (%):  [50-80] 75  Resp Rate Total:  [21 br/min-31 br/min] 24 br/min  Vt Set:  [400 mL] 400  mL  PEEP/CPAP:  [5 cmH20-6 cmH20] 6 cmH20  Pressure Support:  [8 cmH20] 8 cmH20  Mean Airway Pressure:  [9.2 vbQ88-33 cmH20] 9.2 cmH20  Gastrointestinal: Soft, nondistended, nontender.  Skin: warm and dry.    Neurologic:   -E4 V1T M6  -Alert. Intubated and agitated. Nods appropriately to questions. Follows commands.   -PERRL  -Moves all 4 extremities to command.   -UE hand  5/5 and symmetric  -Tone normal.   -Sensation intact to touch in arms, legs.    Today I independently reviewed pertinent medications, lines/drains/airways, imaging, cardiology results, laboratory results, microbiology results,

## 2019-08-23 NOTE — ED NOTES
Spoke to Philomena with [facility]; notified her of patient's BP remaining 80s/50s.  States to start 500 cc NS bolus and will order maintenance fluids.

## 2019-08-23 NOTE — ED TRIAGE NOTES
Pt brought in by private vehicle with family reporting that he was unresponsive. Pt brought to ER 13 and ambu bagging started due to pt O2 sats being in the low 70s. Dr. Marlow at bedside.

## 2019-08-23 NOTE — ED NOTES
IV access established and Narcan 2mg given IV at this time. Pt moved his legs but remains unresponsive. Dr. Marlow noted that pt was clenching his teeth. Pt is unable to maintain his airway on his own. Pt will be intubated.

## 2019-08-23 NOTE — ED PROVIDER NOTES
Encounter Date: 8/23/2019    SCRIBE #1 NOTE: IPrecious, am scribing for, and in the presence of, Raman Damian MD.       History     Chief Complaint   Patient presents with    unresponsive     Patient found unresponsive by family.  Agonal breathing with pinpoint breathing on arrival      HPI  Review of patient's allergies indicates:  No Known Allergies  Past Medical History:   Diagnosis Date    Renal disorder     Seizures      Past Surgical History:   Procedure Laterality Date    HAND SURGERY       No family history on file.  Social History     Tobacco Use    Smoking status: Current Every Day Smoker     Packs/day: 0.50     Types: Cigarettes    Smokeless tobacco: Never Used   Substance Use Topics    Alcohol use: No    Drug use: No     Types: Marijuana     Review of Systems   All other systems reviewed and are negative.      Physical Exam     Initial Vitals   BP Pulse Resp Temp SpO2   -- -- -- -- --      MAP       --         Physical Exam    ED Course   Procedures  Labs Reviewed   POCT GLUCOSE - Abnormal; Notable for the following components:       Result Value    POCT Glucose 304 (*)     All other components within normal limits               Medical Decision Making:   Clinical Tests:   Lab Tests: Ordered and Reviewed                      Clinical Impression:   No diagnosis found.                Scribe Attestation***

## 2019-08-23 NOTE — ED NOTES
Dr. Marlow intubated using 7.5 size ET tube with positive color CO2 change. Pt placed on ventilator per RT.

## 2019-08-23 NOTE — ED NOTES
RT at bedside, Dr. Marlow preparing for intubation- sedating medication given at this time per Dr. Damian.

## 2019-08-23 NOTE — ED NOTES
Pt coughing with pink frothy sputum noted in ET tube. RT at bedside to suction. Dr. Marlow at bedside and verbal order to initiate Propofol.

## 2019-08-23 NOTE — ED PROVIDER NOTES
Encounter Date: 8/23/2019       History     Chief Complaint   Patient presents with    Status epilepticus     Pt is a transfer from [facility] ED for evaluation of status epilepticus.      22 yo M with pmhx seizures (non-compliant with meds) presents as transfer from [facility] for status epilepticus.  History was obtained via chart review and EMS as no family was available at the time of my evaluation and patient is intubated.  Patient was out partying last night.  His family found him unresponsive this morning.  They called 911 but no ambulance showed up so they took him to the emergency department.  Patient was hypoxic to 70% on arrival.  There is no response to Narcan.  He was subsequently intubated with rocuronium and started on a propofol drip to evaluate for ICH.  CT head revealed mild diffuse edema.  CT chest revealed multifocal pneumonia.  Labs revealed a leukocytosis of 14.4.  Lactic acid 11.2.  U tox was positive for opiates and cocaine.  Patient received clindamycin.  He was transferred on a propofol drip.  Glucose was in the 60s prior to transfer, he received D50 with improvement to the 130s.  According to EMS, glucose was greater than 400 at some point.        Review of patient's allergies indicates:  No Known Allergies  Past Medical History:   Diagnosis Date    Renal disorder     Seizures      Past Surgical History:   Procedure Laterality Date    HAND SURGERY       History reviewed. No pertinent family history.  Social History     Tobacco Use    Smoking status: Current Every Day Smoker     Packs/day: 0.50     Types: Cigarettes    Smokeless tobacco: Never Used   Substance Use Topics    Alcohol use: No    Drug use: No     Types: Marijuana     Review of Systems   Unable to perform ROS: Intubated       Physical Exam     Initial Vitals   BP Pulse Resp Temp SpO2   08/23/19 1411 08/23/19 1411 08/23/19 1411 08/23/19 1538 08/23/19 1411   106/70 (!) 120 15 98 °F (36.7 °C) (!) 94 %      MAP        --                Physical Exam    Nursing note and vitals reviewed.  Constitutional: He appears well-developed and well-nourished. He is not diaphoretic. He appears distressed.   HENT:   Head: Normocephalic and atraumatic.   ETT   Eyes: Conjunctivae and EOM are normal.   Neck: Neck supple.   Cardiovascular: S1 normal, S2 normal and intact distal pulses. Tachycardia present.    Pulmonary/Chest: He has no wheezes. He has rhonchi. He has no rales.   Intubated, breathing over vent   Abdominal: Soft. He exhibits no distension. There is no tenderness.   Musculoskeletal: Normal range of motion. He exhibits no edema or tenderness.   Neurological:   Arousable to voice, nods appropriately to questions, follows commands in extremities x4   Skin: Skin is warm and dry. Capillary refill takes less than 2 seconds.         ED Course   Procedures  Labs Reviewed   CK - Abnormal; Notable for the following components:       Result Value     (*)     All other components within normal limits   ISTAT PROCEDURE - Abnormal; Notable for the following components:    POC PH 7.072 (*)     POC PCO2 48.4 (*)     POC HCO3 14.1 (*)     POC SATURATED O2 67 (*)     POC TCO2 16 (*)     All other components within normal limits   ISTAT PROCEDURE - Abnormal; Notable for the following components:    POC Glucose >700 (*)     POC Creatinine 2.7 (*)     POC Sodium 123 (*)     POC Chloride 87 (*)     POC TCO2 (MEASURED) 17 (*)     POC Ionized Calcium 0.85 (*)     POC Hematocrit 55 (*)     All other components within normal limits   ISTAT PROCEDURE - Abnormal; Notable for the following components:    POC Glucose 66 (*)     POC TCO2 (MEASURED) 20 (*)     POC Hematocrit 58 (*)     All other components within normal limits   POCT GLUCOSE - Abnormal; Notable for the following components:    POCT Glucose 59 (*)     All other components within normal limits   CULTURE, RESPIRATORY   CULTURE, BLOOD   CULTURE, BLOOD   LACTIC ACID, PLASMA   URINALYSIS, REFLEX  TO URINE CULTURE   URINALYSIS MICROSCOPIC   POCT GLUCOSE          Imaging Results          X-Ray Chest 1 View (Final result)  Result time 08/23/19 15:02:47    Final result by Ras Ojeda MD (08/23/19 15:02:47)                 Impression:      Interval placement of nasogastric tubing.      Electronically signed by: Ras Ojeda MD  Date:    08/23/2019  Time:    15:02             Narrative:    EXAMINATION:  XR CHEST 1 VIEW    CLINICAL HISTORY:  eval pneumonia;    TECHNIQUE:  Single frontal view of the chest was performed.    COMPARISON:  CT chest without contrast and chest one view from earlier today    FINDINGS:  Diffuse opacification of the right lung is again demonstrated and endotracheal tube is identified positioned an appropriate distance from the toya.    The left lung remains expanded and clear and there is no indication of significant pleural effusion.  Monitor leads and wires are in place.    There is now nasogastric tubing which is positioned within the stomach and its distal tip not identified or included on this image.                                 Medical Decision Making:   History:   I obtained history from: someone other than patient and EMS provider.  Old Medical Records: I decided to obtain old medical records.  Initial Assessment:   24 yo M with pmhx seizures (non-compliant with meds) presents as transfer from [facility] for status epilepticus.   Differential Diagnosis:   Status epilepticus, polypharmacy, drug intoxication versus withdrawal, sepsis, electrolyte abnormalities  Clinical Tests:   Lab Tests: Ordered and Reviewed  Radiological Study: Ordered and Reviewed  Medical Tests: Ordered and Reviewed  ED Management:  Will obtain repeat labs including lactate.  Previous likely possibly secondary to seizure versus status epilepticus, will trend.  Patient received clindamycin but will escalate to Unasyn for aspiration pneumonia coverage.  Due to a pharmacy shortage, no Unasyn  available, will administer ceftriaxone and Flagyl.  Will trend labs.  Neuro ICU consulted. Will hold propofol for neuro ICU evaluation.      Reassessment:  Chest x-ray with ET tube in appropriate position.  There is opacification of the right lung.    Repeat lactic acid cleared to <1.  VBG with respiratory and metabolic acidosis. Patient will be admitted to the neuro ICU.              Attending Attestation:         Attending Critical Care:   Critical Care Times:   Direct Patient Care (initial evaluation, reassessments, and time considering the case)................................................................15 minutes.   Additional History from reviewing old medical records or taking additional history from the family, EMS, PCP, etc.......................10 minutes.   Ordering, Reviewing, and Interpreting Diagnostic Studies...............................................................................................................5 minutes.   Documentation..................................................................................................................................................................................5 minutes.   Consultation with other Physicians. .................................................................................................................................................5 minutes.   ==============================================================  · Total Critical Care Time - exclusive of procedural time: 40 minutes.  ==============================================================  Critical care was necessary to treat or prevent imminent or life-threatening deterioration of the following conditions: status epilepticus.                  Clinical Impression:       ICD-10-CM ICD-9-CM   1. Status epilepticus G40.901 345.3   2. Endotracheally intubated Z97.8 V49.89   3. CHF (congestive heart failure) I50.9 428.0   4. Aspiration pneumonia of right lung, unspecified  aspiration pneumonia type, unspecified part of lung J69.0 507.0                                Teodoro Warner MD  08/23/19 9812

## 2019-08-24 PROBLEM — R07.9 CHEST PAIN: Status: ACTIVE | Noted: 2019-08-24

## 2019-08-24 LAB
ALBUMIN SERPL BCP-MCNC: 3.1 G/DL (ref 3.5–5.2)
ALLENS TEST: ABNORMAL
ALP SERPL-CCNC: 70 U/L (ref 55–135)
ALT SERPL W/O P-5'-P-CCNC: 189 U/L (ref 10–44)
ANION GAP SERPL CALC-SCNC: 10 MMOL/L (ref 8–16)
AST SERPL-CCNC: 137 U/L (ref 10–40)
BASOPHILS # BLD AUTO: 0.16 K/UL (ref 0–0.2)
BASOPHILS NFR BLD: 0.5 % (ref 0–1.9)
BILIRUB SERPL-MCNC: 0.6 MG/DL (ref 0.1–1)
BNP SERPL-MCNC: 65 PG/ML (ref 0–99)
BUN SERPL-MCNC: 13 MG/DL (ref 6–20)
CALCIUM SERPL-MCNC: 8.4 MG/DL (ref 8.7–10.5)
CHLORIDE SERPL-SCNC: 104 MMOL/L (ref 95–110)
CO2 SERPL-SCNC: 23 MMOL/L (ref 23–29)
CREAT SERPL-MCNC: 1.1 MG/DL (ref 0.5–1.4)
DELSYS: ABNORMAL
DIFFERENTIAL METHOD: ABNORMAL
EOSINOPHIL # BLD AUTO: 0 K/UL (ref 0–0.5)
EOSINOPHIL NFR BLD: 0 % (ref 0–8)
ERYTHROCYTE [DISTWIDTH] IN BLOOD BY AUTOMATED COUNT: 13.2 % (ref 11.5–14.5)
ERYTHROCYTE [SEDIMENTATION RATE] IN BLOOD BY WESTERGREN METHOD: 16 MM/H
EST. GFR  (AFRICAN AMERICAN): >60 ML/MIN/1.73 M^2
EST. GFR  (NON AFRICAN AMERICAN): >60 ML/MIN/1.73 M^2
ESTIMATED AVG GLUCOSE: 105 MG/DL (ref 68–131)
FIO2: 40
GLUCOSE SERPL-MCNC: 105 MG/DL (ref 70–110)
HBA1C MFR BLD HPLC: 5.3 % (ref 4–5.6)
HCO3 UR-SCNC: 28 MMOL/L (ref 24–28)
HCT VFR BLD AUTO: 46.9 % (ref 40–54)
HGB BLD-MCNC: 16 G/DL (ref 14–18)
IMM GRANULOCYTES # BLD AUTO: 0.22 K/UL (ref 0–0.04)
IMM GRANULOCYTES NFR BLD AUTO: 0.7 % (ref 0–0.5)
LYMPHOCYTES # BLD AUTO: 1.5 K/UL (ref 1–4.8)
LYMPHOCYTES NFR BLD: 4.9 % (ref 18–48)
MAGNESIUM SERPL-MCNC: 1.1 MG/DL (ref 1.6–2.6)
MCH RBC QN AUTO: 31.7 PG (ref 27–31)
MCHC RBC AUTO-ENTMCNC: 34.1 G/DL (ref 32–36)
MCV RBC AUTO: 93 FL (ref 82–98)
MODE: ABNORMAL
MONOCYTES # BLD AUTO: 1.1 K/UL (ref 0.3–1)
MONOCYTES NFR BLD: 3.6 % (ref 4–15)
NEUTROPHILS # BLD AUTO: 27.1 K/UL (ref 1.8–7.7)
NEUTROPHILS NFR BLD: 90.3 % (ref 38–73)
NRBC BLD-RTO: 0 /100 WBC
PCO2 BLDA: 46.9 MMHG (ref 35–45)
PEEP: 5
PH SMN: 7.38 [PH] (ref 7.35–7.45)
PHOSPHATE SERPL-MCNC: 4 MG/DL (ref 2.7–4.5)
PLATELET # BLD AUTO: 202 K/UL (ref 150–350)
PMV BLD AUTO: 9.4 FL (ref 9.2–12.9)
PO2 BLDA: 137 MMHG (ref 80–100)
POC BE: 3 MMOL/L
POC SATURATED O2: 99 % (ref 95–100)
POC TCO2: 29 MMOL/L (ref 23–27)
POCT GLUCOSE: 101 MG/DL (ref 70–110)
POCT GLUCOSE: 92 MG/DL (ref 70–110)
POCT GLUCOSE: 95 MG/DL (ref 70–110)
POCT GLUCOSE: 97 MG/DL (ref 70–110)
POTASSIUM SERPL-SCNC: 4 MMOL/L (ref 3.5–5.1)
PROT SERPL-MCNC: 5.8 G/DL (ref 6–8.4)
RBC # BLD AUTO: 5.04 M/UL (ref 4.6–6.2)
SAMPLE: ABNORMAL
SITE: ABNORMAL
SODIUM SERPL-SCNC: 137 MMOL/L (ref 136–145)
TROPONIN I SERPL DL<=0.01 NG/ML-MCNC: 0.41 NG/ML (ref 0–0.03)
TROPONIN I SERPL DL<=0.01 NG/ML-MCNC: 0.48 NG/ML (ref 0–0.03)
TROPONIN I SERPL DL<=0.01 NG/ML-MCNC: 0.59 NG/ML (ref 0–0.03)
TROPONIN I SERPL DL<=0.01 NG/ML-MCNC: 0.78 NG/ML (ref 0–0.03)
VT: 400
WBC # BLD AUTO: 30.06 K/UL (ref 3.9–12.7)

## 2019-08-24 PROCEDURE — 95812 EEG 41-60 MINUTES: CPT

## 2019-08-24 PROCEDURE — 25000003 PHARM REV CODE 250: Performed by: NURSE PRACTITIONER

## 2019-08-24 PROCEDURE — 25000003 PHARM REV CODE 250: Performed by: STUDENT IN AN ORGANIZED HEALTH CARE EDUCATION/TRAINING PROGRAM

## 2019-08-24 PROCEDURE — 82803 BLOOD GASES ANY COMBINATION: CPT

## 2019-08-24 PROCEDURE — 25000242 PHARM REV CODE 250 ALT 637 W/ HCPCS: Performed by: STUDENT IN AN ORGANIZED HEALTH CARE EDUCATION/TRAINING PROGRAM

## 2019-08-24 PROCEDURE — 80053 COMPREHEN METABOLIC PANEL: CPT

## 2019-08-24 PROCEDURE — 84484 ASSAY OF TROPONIN QUANT: CPT | Mod: 91

## 2019-08-24 PROCEDURE — 99900026 HC AIRWAY MAINTENANCE (STAT)

## 2019-08-24 PROCEDURE — 84100 ASSAY OF PHOSPHORUS: CPT

## 2019-08-24 PROCEDURE — 36415 COLL VENOUS BLD VENIPUNCTURE: CPT

## 2019-08-24 PROCEDURE — 94640 AIRWAY INHALATION TREATMENT: CPT

## 2019-08-24 PROCEDURE — S0030 INJECTION, METRONIDAZOLE: HCPCS | Performed by: STUDENT IN AN ORGANIZED HEALTH CARE EDUCATION/TRAINING PROGRAM

## 2019-08-24 PROCEDURE — 84484 ASSAY OF TROPONIN QUANT: CPT

## 2019-08-24 PROCEDURE — 99233 SBSQ HOSP IP/OBS HIGH 50: CPT | Mod: ,,, | Performed by: NURSE PRACTITIONER

## 2019-08-24 PROCEDURE — 99900035 HC TECH TIME PER 15 MIN (STAT)

## 2019-08-24 PROCEDURE — 20000000 HC ICU ROOM

## 2019-08-24 PROCEDURE — 63600175 PHARM REV CODE 636 W HCPCS: Performed by: STUDENT IN AN ORGANIZED HEALTH CARE EDUCATION/TRAINING PROGRAM

## 2019-08-24 PROCEDURE — 83735 ASSAY OF MAGNESIUM: CPT

## 2019-08-24 PROCEDURE — 94761 N-INVAS EAR/PLS OXIMETRY MLT: CPT

## 2019-08-24 PROCEDURE — 99900017 HC EXTUBATION W/PARAMETERS (STAT)

## 2019-08-24 PROCEDURE — 27100171 HC OXYGEN HIGH FLOW UP TO 24 HOURS

## 2019-08-24 PROCEDURE — 94003 VENT MGMT INPAT SUBQ DAY: CPT

## 2019-08-24 PROCEDURE — 36600 WITHDRAWAL OF ARTERIAL BLOOD: CPT

## 2019-08-24 PROCEDURE — 83036 HEMOGLOBIN GLYCOSYLATED A1C: CPT

## 2019-08-24 PROCEDURE — 85025 COMPLETE CBC W/AUTO DIFF WBC: CPT

## 2019-08-24 PROCEDURE — 27200966 HC CLOSED SUCTION SYSTEM

## 2019-08-24 PROCEDURE — 27000221 HC OXYGEN, UP TO 24 HOURS

## 2019-08-24 PROCEDURE — S0028 INJECTION, FAMOTIDINE, 20 MG: HCPCS | Performed by: NURSE PRACTITIONER

## 2019-08-24 PROCEDURE — 94150 VITAL CAPACITY TEST: CPT

## 2019-08-24 PROCEDURE — 27100092 HC HIGH FLOW DELIVERY CANNULA

## 2019-08-24 PROCEDURE — 99233 PR SUBSEQUENT HOSPITAL CARE,LEVL III: ICD-10-PCS | Mod: ,,, | Performed by: NURSE PRACTITIONER

## 2019-08-24 PROCEDURE — 83880 ASSAY OF NATRIURETIC PEPTIDE: CPT

## 2019-08-24 RX ORDER — IPRATROPIUM BROMIDE AND ALBUTEROL SULFATE 2.5; .5 MG/3ML; MG/3ML
3 SOLUTION RESPIRATORY (INHALATION)
Status: DISCONTINUED | OUTPATIENT
Start: 2019-08-24 | End: 2019-08-26 | Stop reason: HOSPADM

## 2019-08-24 RX ORDER — MAGNESIUM SULFATE HEPTAHYDRATE 40 MG/ML
2 INJECTION, SOLUTION INTRAVENOUS
Status: DISCONTINUED | OUTPATIENT
Start: 2019-08-24 | End: 2019-08-26 | Stop reason: HOSPADM

## 2019-08-24 RX ORDER — MAGNESIUM SULFATE HEPTAHYDRATE 40 MG/ML
4 INJECTION, SOLUTION INTRAVENOUS
Status: DISCONTINUED | OUTPATIENT
Start: 2019-08-24 | End: 2019-08-26 | Stop reason: HOSPADM

## 2019-08-24 RX ORDER — ALUMINUM HYDROXIDE, MAGNESIUM HYDROXIDE, AND SIMETHICONE 2400; 240; 2400 MG/30ML; MG/30ML; MG/30ML
30 SUSPENSION ORAL EVERY 6 HOURS PRN
Status: DISCONTINUED | OUTPATIENT
Start: 2019-08-24 | End: 2019-08-26 | Stop reason: HOSPADM

## 2019-08-24 RX ORDER — ASPIRIN 325 MG
325 TABLET ORAL DAILY
Status: DISCONTINUED | OUTPATIENT
Start: 2019-08-24 | End: 2019-08-26 | Stop reason: HOSPADM

## 2019-08-24 RX ORDER — FENTANYL CITRATE 50 UG/ML
25 INJECTION, SOLUTION INTRAMUSCULAR; INTRAVENOUS ONCE
Status: DISCONTINUED | OUTPATIENT
Start: 2019-08-24 | End: 2019-08-26 | Stop reason: HOSPADM

## 2019-08-24 RX ORDER — ALUMINUM HYDROXIDE, MAGNESIUM HYDROXIDE, AND SIMETHICONE 2400; 240; 2400 MG/30ML; MG/30ML; MG/30ML
30 SUSPENSION ORAL ONCE
Status: COMPLETED | OUTPATIENT
Start: 2019-08-24 | End: 2019-08-24

## 2019-08-24 RX ORDER — FAMOTIDINE 20 MG/1
20 TABLET, FILM COATED ORAL 2 TIMES DAILY
Status: CANCELLED | OUTPATIENT
Start: 2019-08-24

## 2019-08-24 RX ADMIN — FAMOTIDINE 20 MG: 10 INJECTION, SOLUTION INTRAVENOUS at 08:08

## 2019-08-24 RX ADMIN — METRONIDAZOLE 500 MG: 500 INJECTION, SOLUTION INTRAVENOUS at 08:08

## 2019-08-24 RX ADMIN — CHLORHEXIDINE GLUCONATE 0.12% ORAL RINSE 15 ML: 1.2 LIQUID ORAL at 08:08

## 2019-08-24 RX ADMIN — METRONIDAZOLE 500 MG: 500 INJECTION, SOLUTION INTRAVENOUS at 11:08

## 2019-08-24 RX ADMIN — ALUMINUM HYDROXIDE, MAGNESIUM HYDROXIDE, AND DIMETHICONE 30 ML: 400; 400; 40 SUSPENSION ORAL at 03:08

## 2019-08-24 RX ADMIN — ASPIRIN 325 MG ORAL TABLET 325 MG: 325 PILL ORAL at 08:08

## 2019-08-24 RX ADMIN — HEPARIN SODIUM 5000 UNITS: 5000 INJECTION, SOLUTION INTRAVENOUS; SUBCUTANEOUS at 09:08

## 2019-08-24 RX ADMIN — HEPARIN SODIUM 5000 UNITS: 5000 INJECTION, SOLUTION INTRAVENOUS; SUBCUTANEOUS at 02:08

## 2019-08-24 RX ADMIN — CEFTRIAXONE SODIUM 2 G: 2 INJECTION, POWDER, FOR SOLUTION INTRAMUSCULAR; INTRAVENOUS at 02:08

## 2019-08-24 RX ADMIN — CHLORHEXIDINE GLUCONATE 0.12% ORAL RINSE 15 ML: 1.2 LIQUID ORAL at 09:08

## 2019-08-24 RX ADMIN — HEPARIN SODIUM 5000 UNITS: 5000 INJECTION, SOLUTION INTRAVENOUS; SUBCUTANEOUS at 05:08

## 2019-08-24 RX ADMIN — SENNOSIDES,DOCUSATE SODIUM 1 TABLET: 8.6; 5 TABLET, FILM COATED ORAL at 08:08

## 2019-08-24 RX ADMIN — IPRATROPIUM BROMIDE AND ALBUTEROL SULFATE 3 ML: .5; 3 SOLUTION RESPIRATORY (INHALATION) at 03:08

## 2019-08-24 RX ADMIN — METRONIDAZOLE 500 MG: 500 INJECTION, SOLUTION INTRAVENOUS at 03:08

## 2019-08-24 NOTE — ASSESSMENT & PLAN NOTE
Positive tox screen for cocaine  -troponins, CPK  -avoid BB  -8/24 troponin trending down and troponin pending and echo read pending and cards verbally oked transfer

## 2019-08-24 NOTE — ASSESSMENT & PLAN NOTE
-CT chest with multifocal pneumonia  -CXR with diffuse opacification in R lung  --VAP precautions  --empiric abx ceftriaxone and flagyl  --f/u cultures  -8/24 extubated

## 2019-08-24 NOTE — SUBJECTIVE & OBJECTIVE
Past Medical History:   Diagnosis Date    Renal disorder     Seizures      Past Surgical History:   Procedure Laterality Date    HAND SURGERY         No current facility-administered medications on file prior to encounter.      No current outpatient medications on file prior to encounter.     Allergies: Patient has no known allergies.    History reviewed. No pertinent family history.    Social History     Tobacco Use    Smoking status: Current Every Day Smoker     Packs/day: 0.50     Types: Cigarettes    Smokeless tobacco: Never Used   Substance Use Topics    Alcohol use: No    Drug use: Yes     Types: Marijuana, Cocaine      ROS  Constitutional: Negative for fever and malaise/fatigue.   Eyes: Negative for blurred vision, double vision and discharge.   Respiratory: Negative for cough, shortness of breath, and wheezing.    Cardiovascular: Negative for chest pain, palpitations, claudication, leg swelling and PND. + mild chest pain 4/10  Gastrointestinal: Negative for abdominal pain, constipation, diarrhea, nausea and vomiting.   Genitourinary: Negative for dysuria, frequency and urgency.   Musculoskeletal:  + generalized weakness. Negative for back pain and myalgias.   Skin: Negative for itching and rash.   Neurological: Negative for dizziness, speech change, seizures, and headaches.   Psychiatric/Behavioral: Negative for depression. The patient is not nervous/anxious.    Vitals:   Temp: 98 °F (36.7 °C)  Pulse: 106  Rhythm: sinus tachycardia  BP: 118/72  MAP (mmHg): 89  Resp: 10  SpO2: 99 %  Oxygen Concentration (%): 40  O2 Device (Oxygen Therapy): nasal cannula  Vent Mode: Spont  Set Rate: 16 bmp  Vt Set: 400 mL  Pressure Support: 5 cmH20  PEEP/CPAP: 5 cmH20  Peak Airway Pressure: 10 cmH2O  Mean Airway Pressure: 8.3 cmH20  Plateau Pressure: 22 cmH20    Temp  Min: 97.5 °F (36.4 °C)  Max: 99.3 °F (37.4 °C)  Pulse  Min: 95  Max: 117  BP  Min: 78/45  Max: 148/74  MAP (mmHg)  Min: 57  Max: 103  Resp  Min: 8  Max:  33  SpO2  Min: 93 %  Max: 100 %  Oxygen Concentration (%)  Min: 40  Max: 100    08/23 0701 - 08/24 0700  In: 479.2 [I.V.:79.2]  Out: 1165 [Urine:1165]   Unmeasured Output  Stool Occurrence: 0     GA: Alert, comfortable, no acute distress.   HEENT: No scleral icterus or JVD.   Pulmonary: Clear to auscultation A/P/L on left, Right is coarse and dimiished. No wheezing, crackles, or rhonchi.  Cardiac: RRR S1 & S2 w/o rubs/murmurs/gallops.   Abdominal: Bowel sounds present x 4. No appreciable hepatosplenomegaly.  Skin: No jaundice, rashes, or visible lesions.  Neuro:  --GCS: E4 V5 M6  --Mental Status:  AAO x 3, following commands   --CN II-XII grossly intact.   --Pupils 3mm, PERRL.   --Corneal reflex, gag, cough intact.  --LUE strength: 5/5  --LLE strength: 5/5  --RUE strength: 5/5  --RLE strength: 5/5    Today I independently reviewed pertinent medications, lines/drains/airways, imaging, cardiology results, laboratory results, microbiology results,

## 2019-08-24 NOTE — ASSESSMENT & PLAN NOTE
8/24 troponins trending down and echo read pending and cards verbally oked transfer  8/24 responded to mylanta

## 2019-08-24 NOTE — PROVIDER TRANSFER
Neuro Critical Care Transfer of Care note    Date of Admit: 8/23/2019  Date of Transfer / Stepdown: 8/24/2019    Brief History of Present Illness:   Mr. Alka Rowe is a 22 yo M with hx of seizures (non-compliant with meds) who presents as a transfer from [facility] for status epilepticus(?). Per chart review and ED record, patient was out partying last night and family found him unresponsive in the morning. 911 was called but no ambulance showed up so family took him to [place] ED. Pt was hypoxic to 70% on arrival and no response to narcan. He was intubated and started on propofol drip to evaluate for ICH. CTH revealed mild diffuse edema. CT chest revealed multifocal pneumonia. Labs revealed leukocytosis of 14.4, lactic acid 11.2, and utox positive for opiates and cocaine. Glucose was in the 60s, he received D50 with improvement to the 130s. [place] [facility] [place] and on propofol and dobutamine. Admitted to [facility] for higher level of care.     Hospital Course: 08/23/2019: admitted to [facility]   08/24 transfer to medicine, troponin pending and echo read pending and cards verbally oked transfer    Hospital Course By Problem with Pertinent Findings:     Neuro  * Status epilepticus  Presented to [place] w/ AMS, unresponsive, resp distress  -utox positive for cocaine and opioids  -not responsive to narcan  -CTH with cerebral edema  --neurochecks/vitals q1h  --SBP goal <180  -8/24-questionable SE, spot eeg pending  -8/24 extubated   -08/24 transfer to medicine     Psychiatric  Opiate abuse, continuous  Positive tox screen for opioids  -not responsive to narcan on arrival     Cocaine abuse  Positive tox screen for cocaine  -troponins, CPK  -avoid BB  -8/24 troponin trending down and troponin pending and echo read pending and cards verbally oked transfer     Pulmonary  Acute respiratory failure with hypoxia     -CT chest with multifocal pneumonia  -CXR with diffuse opacification in R lung  --VAP  precautions  --empiric abx ceftriaxone and flagyl  --f/u cultures  -8/24 extubated       Other  Chest pain  8/24 troponins trending down and echo read pending and cards verbally oked transfer  8/24 responded to jovanyta       Consultants and Procedures:       Procedures:    TTE pending    Transfer Information:     Diet:  Regular    Physical Activity:  As tolerated, walking with no issues, back to baseline    To Do / Pending Studies / Follow ups:  F/u trops and eeg read    Jez Kohler  Neuro Crtical Care

## 2019-08-24 NOTE — PLAN OF CARE
Alka Rowe is a 24 yo M with hx of seizures (non-compliant with meds) who presents as a transfer from [facility] for status epilepticus(?). Per chart review and ED record, patient was out partying night PTA and family found him unresponsive in the morning. 911 was called but no ambulance showed up so family took him to [place] ED. Pt was hypoxic to 70% on arrival and no response to narcan. He was intubated and started on propofol drip to evaluate for ICH. CTH revealed mild diffuse edema. CT chest revealed multifocal pneumonia. Labs revealed leukocytosis of 14.4, lactic acid 11.2, and utox positive for opiates and cocaine. Glucose was in the 60s, he received D50 with improvement to the 130s. Pt was transferred to [facility] [place] and on propofol and dobutamine. Admitted to [facility] for higher level of care.     He was initiated on BSABx and administered lasix with improvement in respiratory status. Extubated to NC on 8/24. Reports chest pain. On admission, did have elevated troponin; EKG non-ischemic. Awaiting TTE. Per [facility], was discussed with Cardiology but felt to be related to sepsis vs cocaine use and no further medical therapy needed at this time.    IMR to assume care once patient reaches the floor.    Alessandro Mckeon MD  Hospital Medicine  Pager: [phone]  Spectra: [phone]

## 2019-08-24 NOTE — PROGRESS NOTES
[facility]  Neurocritical Care  Progress Note    Admit Date: 8/23/2019  Service Date: 08/24/2019  Length of Stay: 1    Subjective:     Chief Complaint: Status epilepticus    History of Present Illness: Mr. Alka Rowe is a 24 yo M with hx of seizures (non-compliant with meds) who presents as a transfer from [facility] for status epilepticus(?). Per chart review and ED record, patient was out partying last night and family found him unresponsive in the morning. 911 was called but no ambulance showed up so family took him to [place] ED. Pt was hypoxic to 70% on arrival and no response to narcan. He was intubated and started on propofol drip to evaluate for ICH. CTH revealed mild diffuse edema. CT chest revealed multifocal pneumonia. Labs revealed leukocytosis of 14.4, lactic acid 11.2, and utox positive for opiates and cocaine. Glucose was in the 60s, he received D50 with improvement to the 130s. [place] [facility] [place] and on propofol and dobutamine. Admitted to [facility] for higher level of care.     Hospital Course: 08/23/2019: admitted to [facility]   08/24 transfer to medicine, troponin pending and echo read pending and cards verbally oked transfer    Past Medical History:   Diagnosis Date    Renal disorder     Seizures      Past Surgical History:   Procedure Laterality Date    HAND SURGERY         No current facility-administered medications on file prior to encounter.      No current outpatient medications on file prior to encounter.     Allergies: Patient has no known allergies.    History reviewed. No pertinent family history.    Social History     Tobacco Use    Smoking status: Current Every Day Smoker     Packs/day: 0.50     Types: Cigarettes    Smokeless tobacco: Never Used   Substance Use Topics    Alcohol use: No    Drug use: Yes     Types: Marijuana, Cocaine      ROS  Constitutional: Negative for fever and malaise/fatigue.   Eyes: Negative for blurred vision, double vision  and discharge.   Respiratory: Negative for cough, shortness of breath, and wheezing.    Cardiovascular: Negative for chest pain, palpitations, claudication, leg swelling and PND. + mild chest pain 4/10  Gastrointestinal: Negative for abdominal pain, constipation, diarrhea, nausea and vomiting.   Genitourinary: Negative for dysuria, frequency and urgency.   Musculoskeletal:  + generalized weakness. Negative for back pain and myalgias.   Skin: Negative for itching and rash.   Neurological: Negative for dizziness, speech change, seizures, and headaches.   Psychiatric/Behavioral: Negative for depression. The patient is not nervous/anxious.    Vitals:   Temp: 98 °F (36.7 °C)  Pulse: 106  Rhythm: sinus tachycardia  BP: 118/72  MAP (mmHg): 89  Resp: 10  SpO2: 99 %  Oxygen Concentration (%): 40  O2 Device (Oxygen Therapy): nasal cannula  Vent Mode: Spont  Set Rate: 16 bmp  Vt Set: 400 mL  Pressure Support: 5 cmH20  PEEP/CPAP: 5 cmH20  Peak Airway Pressure: 10 cmH2O  Mean Airway Pressure: 8.3 cmH20  Plateau Pressure: 22 cmH20    Temp  Min: 97.5 °F (36.4 °C)  Max: 99.3 °F (37.4 °C)  Pulse  Min: 95  Max: 117  BP  Min: 78/45  Max: 148/74  MAP (mmHg)  Min: 57  Max: 103  Resp  Min: 8  Max: 33  SpO2  Min: 93 %  Max: 100 %  Oxygen Concentration (%)  Min: 40  Max: 100    08/23 0701 - 08/24 0700  In: 479.2 [I.V.:79.2]  Out: 1165 [Urine:1165]   Unmeasured Output  Stool Occurrence: 0     GA: Alert, comfortable, no acute distress.   HEENT: No scleral icterus or JVD.   Pulmonary: Clear to auscultation A/P/L on left, Right is coarse and dimiished. No wheezing, crackles, or rhonchi.  Cardiac: RRR S1 & S2 w/o rubs/murmurs/gallops.   Abdominal: Bowel sounds present x 4. No appreciable hepatosplenomegaly.  Skin: No jaundice, rashes, or visible lesions.  Neuro:  --GCS: E4 V5 M6  --Mental Status:  AAO x 3, following commands   --CN II-XII grossly intact.   --Pupils 3mm, PERRL.   --Corneal reflex, gag, cough intact.  --LUE strength: 5/5  --LLE  strength: 5/5  --RUE strength: 5/5  --RLE strength: 5/5    Today I independently reviewed pertinent medications, lines/drains/airways, imaging, cardiology results, laboratory results, microbiology results,       Assessment/Plan:     Neuro  * Status epilepticus  Presented to [facility] w/ AMS, unresponsive, resp distress  -utox positive for cocaine and opioids  -not responsive to narcan  -CTH with cerebral edema  --neurochecks/vitals q1h  --SBP goal <180  -8/24-questionable SE, spot eeg pending  -8/24 extubated   -08/24 transfer to medicine    Psychiatric  Opiate abuse, continuous  Positive tox screen for opioids  -not responsive to narcan on arrival    Cocaine abuse  Positive tox screen for cocaine  -troponins, CPK  -avoid BB  -8/24 troponin trending down and troponin pending and echo read pending and cards verbally oked transfer    Pulmonary  Acute respiratory failure with hypoxia    -CT chest with multifocal pneumonia  -CXR with diffuse opacification in R lung  --VAP precautions  --empiric abx ceftriaxone and flagyl  --f/u cultures  -8/24 extubated        Other  Chest pain  8/24 troponins trending down and echo read pending and cards verbally oked transfer  8/24 responded to mylanta          Activity Orders          Diet Adult Regular (IDDSI Level 7) [facility]; Isolation Tray - Regular [place]; Thin: Regular starting at 08/24 1037        Full Code    Jez Kohler NP  Neurocritical Care  [facility]

## 2019-08-24 NOTE — ASSESSMENT & PLAN NOTE
Presented to [facility]/ [facility], unresponsive, resp distress  -utox positive for cocaine and opioids  -not responsive to narcan  -CTH with cerebral edema  --neurochecks/vitals q1h  --SBP goal <180  -8/24-questionable SE, spot eeg pending  -8/24 extubated   -08/24 transfer to medicine

## 2019-08-24 NOTE — PLAN OF CARE
Problem: Fall Injury Risk  Goal: Absence of Fall and Fall-Related Injury    Intervention: Identify and Manage Contributors to Fall Injury Risk  POC reviewed with pt and family at 1400. Pt verbalized understanding. Questions and concerns addressed. Pt neuro exam stable. Pt extubated today, on 2L NC, R lung sounds coarse, pt occasionally coughing up clear, pink sputum. Passed RUPERT, regular diet ordered, urinal at bedside. 1 hr EEG done. Pt c/o chest soreness intermittently throughout shift, Jez, NP with NCC aware, troponin trending down q8, Echo done at bedside. No acute events today. Pt progressing toward goals. Will continue to monitor. See flowsheets for full assessment and VS info.

## 2019-08-24 NOTE — PLAN OF CARE
Problem: Adult Inpatient Plan of Care  Goal: Plan of Care Review  Outcome: Ongoing (interventions implemented as appropriate)  POC reviewed with pt at 0500. Pt nodded to understanding. Questions and concerns addressed. No acute events today. Pt progressing toward goals. Plan to extubate this AM. Will continue to monitor. See flowsheets for full assessment and VS info.

## 2019-08-24 NOTE — PROGRESS NOTES
Pt complained of chest pain, last troponin was elevated. Notified MD Nati, MD to bedside. 12 lead EKG complete, labs sent off. Will continue to monitor.

## 2019-08-24 NOTE — PROGRESS NOTES
Pt passed SBT and was extubated to 3L NC per MD order.  Pt tolerated well and is resting comfortably.  Will continue to monitor.

## 2019-08-24 NOTE — NURSING
Pt extubated by PEDRO Marcus per NCC orders. Placed on 2L NC. Pt tolerated well, VSS, resting comfortably. Will continue to monitor pt.

## 2019-08-24 NOTE — NURSING
Patient arrived to [facility] from  [facility]. Keith to ED to [facility] 3853    Type of stroke/diagnosis:  Status Epilepticus      Thrombectomy start and end time (if applicable)    Current symptoms: unresponsiveness, respiratory distress    Skin assessment done: Yes  Wounds noted: none    NCC notified: Philomena WILKINS was notified at 1810H

## 2019-08-25 PROBLEM — E87.20 METABOLIC ACIDOSIS: Status: RESOLVED | Noted: 2019-08-23 | Resolved: 2019-08-25

## 2019-08-25 PROBLEM — R57.0 CARDIOGENIC SHOCK: Status: RESOLVED | Noted: 2019-08-23 | Resolved: 2019-08-25

## 2019-08-25 PROBLEM — R74.01 TRANSAMINITIS: Status: ACTIVE | Noted: 2019-08-25

## 2019-08-25 PROBLEM — R79.89 ELEVATED TROPONIN: Status: ACTIVE | Noted: 2019-08-25

## 2019-08-25 PROBLEM — J69.0 ASPIRATION PNEUMONIA: Status: ACTIVE | Noted: 2019-08-25

## 2019-08-25 LAB
ALBUMIN SERPL BCP-MCNC: 2.8 G/DL (ref 3.5–5.2)
ALP SERPL-CCNC: 72 U/L (ref 55–135)
ALT SERPL W/O P-5'-P-CCNC: 97 U/L (ref 10–44)
ANION GAP SERPL CALC-SCNC: 8 MMOL/L (ref 8–16)
ASCENDING AORTA: 2.29 CM
AST SERPL-CCNC: 45 U/L (ref 10–40)
BASOPHILS # BLD AUTO: 0.03 K/UL (ref 0–0.2)
BASOPHILS NFR BLD: 0.1 % (ref 0–1.9)
BILIRUB SERPL-MCNC: 0.9 MG/DL (ref 0.1–1)
BSA FOR ECHO PROCEDURE: 1.65 M2
BUN SERPL-MCNC: 12 MG/DL (ref 6–20)
CALCIUM SERPL-MCNC: 9.1 MG/DL (ref 8.7–10.5)
CHLORIDE SERPL-SCNC: 102 MMOL/L (ref 95–110)
CHOLEST SERPL-MCNC: 71 MG/DL (ref 120–199)
CHOLEST/HDLC SERPL: 1.7 {RATIO} (ref 2–5)
CO2 SERPL-SCNC: 26 MMOL/L (ref 23–29)
CREAT SERPL-MCNC: 0.9 MG/DL (ref 0.5–1.4)
CV ECHO LV RWT: 0.27 CM
DIFFERENTIAL METHOD: ABNORMAL
DOP CALC LVOT AREA: 3 CM2
DOP CALC LVOT DIAMETER: 1.94 CM
DOP CALC LVOT PEAK VEL: 1.21 M/S
DOP CALC LVOT STROKE VOLUME: 50.14 CM3
DOP CALCLVOT PEAK VEL VTI: 16.97 CM
E WAVE DECELERATION TIME: 88.99 MSEC
E/A RATIO: 1.05
ECHO LV POSTERIOR WALL: 0.65 CM (ref 0.6–1.1)
EOSINOPHIL # BLD AUTO: 0 K/UL (ref 0–0.5)
EOSINOPHIL NFR BLD: 0 % (ref 0–8)
ERYTHROCYTE [DISTWIDTH] IN BLOOD BY AUTOMATED COUNT: 12.9 % (ref 11.5–14.5)
EST. GFR  (AFRICAN AMERICAN): >60 ML/MIN/1.73 M^2
EST. GFR  (NON AFRICAN AMERICAN): >60 ML/MIN/1.73 M^2
FRACTIONAL SHORTENING: 34 % (ref 28–44)
GLUCOSE SERPL-MCNC: 96 MG/DL (ref 70–110)
HCT VFR BLD AUTO: 42.1 % (ref 40–54)
HDLC SERPL-MCNC: 41 MG/DL (ref 40–75)
HDLC SERPL: 57.7 % (ref 20–50)
HGB BLD-MCNC: 14 G/DL (ref 14–18)
IMM GRANULOCYTES # BLD AUTO: 0.24 K/UL (ref 0–0.04)
IMM GRANULOCYTES NFR BLD AUTO: 1.2 % (ref 0–0.5)
INTERVENTRICULAR SEPTUM: 0.58 CM (ref 0.6–1.1)
IVRT: 0.05 MSEC
LDLC SERPL CALC-MCNC: 17.2 MG/DL (ref 63–159)
LEFT ATRIUM SIZE: 2 CM
LEFT INTERNAL DIMENSION IN SYSTOLE: 3.2 CM (ref 2.1–4)
LEFT VENTRICLE DIASTOLIC VOLUME INDEX: 65.17 ML/M2
LEFT VENTRICLE DIASTOLIC VOLUME: 109.08 ML
LEFT VENTRICLE MASS INDEX: 55 G/M2
LEFT VENTRICLE SYSTOLIC VOLUME INDEX: 24.5 ML/M2
LEFT VENTRICLE SYSTOLIC VOLUME: 41.09 ML
LEFT VENTRICULAR INTERNAL DIMENSION IN DIASTOLE: 4.83 CM (ref 3.5–6)
LEFT VENTRICULAR MASS: 92.01 G
LYMPHOCYTES # BLD AUTO: 1.1 K/UL (ref 1–4.8)
LYMPHOCYTES NFR BLD: 5.2 % (ref 18–48)
MCH RBC QN AUTO: 31.1 PG (ref 27–31)
MCHC RBC AUTO-ENTMCNC: 33.3 G/DL (ref 32–36)
MCV RBC AUTO: 94 FL (ref 82–98)
MONOCYTES # BLD AUTO: 0.7 K/UL (ref 0.3–1)
MONOCYTES NFR BLD: 3.6 % (ref 4–15)
MV PEAK A VEL: 0.8 M/S
MV PEAK E VEL: 0.84 M/S
NEUTROPHILS # BLD AUTO: 18.4 K/UL (ref 1.8–7.7)
NEUTROPHILS NFR BLD: 89.9 % (ref 38–73)
NONHDLC SERPL-MCNC: 30 MG/DL
NRBC BLD-RTO: 0 /100 WBC
PLATELET # BLD AUTO: 193 K/UL (ref 150–350)
PMV BLD AUTO: 9.6 FL (ref 9.2–12.9)
POTASSIUM SERPL-SCNC: 3.9 MMOL/L (ref 3.5–5.1)
PROT SERPL-MCNC: 5.9 G/DL (ref 6–8.4)
RBC # BLD AUTO: 4.5 M/UL (ref 4.6–6.2)
RIGHT VENTRICULAR END-DIASTOLIC DIMENSION: 3.14 CM
SINUS: 2.73 CM
SODIUM SERPL-SCNC: 136 MMOL/L (ref 136–145)
STJ: 2.39 CM
TRICUSPID ANNULAR PLANE SYSTOLIC EXCURSION: 2.62 CM
TRIGL SERPL-MCNC: 64 MG/DL (ref 30–150)
TROPONIN I SERPL DL<=0.01 NG/ML-MCNC: 0.29 NG/ML (ref 0–0.03)
WBC # BLD AUTO: 20.47 K/UL (ref 3.9–12.7)

## 2019-08-25 PROCEDURE — S0030 INJECTION, METRONIDAZOLE: HCPCS | Performed by: STUDENT IN AN ORGANIZED HEALTH CARE EDUCATION/TRAINING PROGRAM

## 2019-08-25 PROCEDURE — 25000003 PHARM REV CODE 250: Performed by: HOSPITALIST

## 2019-08-25 PROCEDURE — 80061 LIPID PANEL: CPT

## 2019-08-25 PROCEDURE — 95812 EEG 41-60 MINUTES: CPT | Mod: 26,,, | Performed by: PSYCHIATRY & NEUROLOGY

## 2019-08-25 PROCEDURE — 94640 AIRWAY INHALATION TREATMENT: CPT

## 2019-08-25 PROCEDURE — 99233 PR SUBSEQUENT HOSPITAL CARE,LEVL III: ICD-10-PCS | Mod: ,,, | Performed by: HOSPITALIST

## 2019-08-25 PROCEDURE — 25000003 PHARM REV CODE 250: Performed by: STUDENT IN AN ORGANIZED HEALTH CARE EDUCATION/TRAINING PROGRAM

## 2019-08-25 PROCEDURE — 80053 COMPREHEN METABOLIC PANEL: CPT

## 2019-08-25 PROCEDURE — 80074 ACUTE HEPATITIS PANEL: CPT

## 2019-08-25 PROCEDURE — S0028 INJECTION, FAMOTIDINE, 20 MG: HCPCS | Performed by: NURSE PRACTITIONER

## 2019-08-25 PROCEDURE — 36415 COLL VENOUS BLD VENIPUNCTURE: CPT

## 2019-08-25 PROCEDURE — 25000003 PHARM REV CODE 250: Performed by: NURSE PRACTITIONER

## 2019-08-25 PROCEDURE — 27000221 HC OXYGEN, UP TO 24 HOURS

## 2019-08-25 PROCEDURE — 84484 ASSAY OF TROPONIN QUANT: CPT

## 2019-08-25 PROCEDURE — 63600175 PHARM REV CODE 636 W HCPCS: Performed by: STUDENT IN AN ORGANIZED HEALTH CARE EDUCATION/TRAINING PROGRAM

## 2019-08-25 PROCEDURE — 99233 SBSQ HOSP IP/OBS HIGH 50: CPT | Mod: ,,, | Performed by: HOSPITALIST

## 2019-08-25 PROCEDURE — 85025 COMPLETE CBC W/AUTO DIFF WBC: CPT

## 2019-08-25 PROCEDURE — 11000001 HC ACUTE MED/SURG PRIVATE ROOM

## 2019-08-25 PROCEDURE — 25000242 PHARM REV CODE 250 ALT 637 W/ HCPCS: Performed by: STUDENT IN AN ORGANIZED HEALTH CARE EDUCATION/TRAINING PROGRAM

## 2019-08-25 PROCEDURE — 94761 N-INVAS EAR/PLS OXIMETRY MLT: CPT

## 2019-08-25 PROCEDURE — 95812 PR EEG,EXTENDED MONITORING,41-60 MINUTES: ICD-10-PCS | Mod: 26,,, | Performed by: PSYCHIATRY & NEUROLOGY

## 2019-08-25 RX ORDER — FAMOTIDINE 20 MG/1
20 TABLET, FILM COATED ORAL DAILY
Status: DISCONTINUED | OUTPATIENT
Start: 2019-08-26 | End: 2019-08-26 | Stop reason: HOSPADM

## 2019-08-25 RX ORDER — AMOXICILLIN AND CLAVULANATE POTASSIUM 875; 125 MG/1; MG/1
1 TABLET, FILM COATED ORAL EVERY 12 HOURS
Status: DISCONTINUED | OUTPATIENT
Start: 2019-08-25 | End: 2019-08-26 | Stop reason: HOSPADM

## 2019-08-25 RX ORDER — METRONIDAZOLE 500 MG/1
500 TABLET ORAL
Status: DISCONTINUED | OUTPATIENT
Start: 2019-08-25 | End: 2019-08-25

## 2019-08-25 RX ORDER — ACETAMINOPHEN 325 MG/1
650 TABLET ORAL EVERY 6 HOURS PRN
Status: DISCONTINUED | OUTPATIENT
Start: 2019-08-25 | End: 2019-08-26 | Stop reason: HOSPADM

## 2019-08-25 RX ADMIN — CHLORHEXIDINE GLUCONATE 0.12% ORAL RINSE 15 ML: 1.2 LIQUID ORAL at 09:08

## 2019-08-25 RX ADMIN — SENNOSIDES,DOCUSATE SODIUM 1 TABLET: 8.6; 5 TABLET, FILM COATED ORAL at 09:08

## 2019-08-25 RX ADMIN — AMOXICILLIN AND CLAVULANATE POTASSIUM 1 TABLET: 875; 125 TABLET, FILM COATED ORAL at 09:08

## 2019-08-25 RX ADMIN — HEPARIN SODIUM 5000 UNITS: 5000 INJECTION, SOLUTION INTRAVENOUS; SUBCUTANEOUS at 09:08

## 2019-08-25 RX ADMIN — HEPARIN SODIUM 5000 UNITS: 5000 INJECTION, SOLUTION INTRAVENOUS; SUBCUTANEOUS at 06:08

## 2019-08-25 RX ADMIN — METRONIDAZOLE 500 MG: 500 INJECTION, SOLUTION INTRAVENOUS at 09:08

## 2019-08-25 RX ADMIN — IPRATROPIUM BROMIDE AND ALBUTEROL SULFATE 3 ML: .5; 3 SOLUTION RESPIRATORY (INHALATION) at 01:08

## 2019-08-25 RX ADMIN — FAMOTIDINE 20 MG: 10 INJECTION, SOLUTION INTRAVENOUS at 09:08

## 2019-08-25 RX ADMIN — HEPARIN SODIUM 5000 UNITS: 5000 INJECTION, SOLUTION INTRAVENOUS; SUBCUTANEOUS at 02:08

## 2019-08-25 RX ADMIN — ASPIRIN 325 MG ORAL TABLET 325 MG: 325 PILL ORAL at 09:08

## 2019-08-25 NOTE — HPI
Alka Rowe is a 22 yo M with hx of seizures (non-compliant with meds) who presents as a transfer from [facility] for status epilepticus(?). Per chart review and ED record, patient was out partying last night and family found him unresponsive in the morning. 911 was called but no ambulance showed up so family took him to [place] ED. Pt was hypoxic to 70% on arrival and no response to narcan. He was intubated and started on propofol drip to evaluate for ICH. CTH revealed mild diffuse edema. CT chest revealed multifocal pneumonia. Labs revealed leukocytosis of 14.4, lactic acid 11.2, and utox positive for opiates and cocaine. Glucose was in the 60s, he received D50 with improvement to the 130s. Pt was transferred to [facility] intubated and on propofol and dobutamine. Admitted to [facility] for higher level of care.

## 2019-08-25 NOTE — ASSESSMENT & PLAN NOTE
- utox positive on admission  - counseling provided  - suspect that elevated troponin in setting of vasospasm

## 2019-08-25 NOTE — SUBJECTIVE & OBJECTIVE
Interval History: reports his breathing has improved, although still somewhat short of breath. Still with persistent chest pain, which is worse with cough and is reproducible. Notes small amount of blood tinged sputum with cough.    Review of Systems   Constitutional: Negative for chills, diaphoresis and fever.   HENT: Negative for congestion and sore throat.    Eyes: Negative for discharge and visual disturbance.   Respiratory: Positive for cough and shortness of breath.    Cardiovascular: Positive for chest pain. Negative for leg swelling.   Gastrointestinal: Negative for abdominal pain, nausea and vomiting.   Genitourinary: Negative for dysuria and flank pain.   Musculoskeletal: Negative for arthralgias and joint swelling.   Skin: Negative for rash and wound.   Allergic/Immunologic: Negative for immunocompromised state.   Neurological: Negative for light-headedness and headaches.   Psychiatric/Behavioral: Negative for agitation and confusion.     Objective:     Vital Signs (Most Recent):  Temp: 99.6 °F (37.6 °C) (08/25/19 1219)  Pulse: 108 (08/25/19 1219)  Resp: (!) 22 (08/25/19 1219)  BP: 107/63 (08/25/19 1219)  SpO2: (!) 91 % (08/25/19 1219) Vital Signs (24h Range):  Temp:  [98.2 °F (36.8 °C)-99.8 °F (37.7 °C)] 99.6 °F (37.6 °C)  Pulse:  [] 108  Resp:  [20-42] 22  SpO2:  [91 %-100 %] 91 %  BP: (107-136)/(56-83) 107/63     Weight: 56.7 kg (125 lb)  Body mass index is 19.01 kg/m².    Intake/Output Summary (Last 24 hours) at 8/25/2019 1445  Last data filed at 8/25/2019 0600  Gross per 24 hour   Intake 255 ml   Output 1175 ml   Net -920 ml      Physical Exam   Constitutional: He is oriented to person, place, and time. He appears well-developed and well-nourished. No distress.   HENT:   Head: Normocephalic and atraumatic.   Nose: Nose normal.   Eyes: Pupils are equal, round, and reactive to light. EOM are normal. No scleral icterus.   Neck: Normal range of motion. No JVD present. No tracheal deviation present.    Cardiovascular: Normal rate, regular rhythm and normal heart sounds. Exam reveals no gallop and no friction rub.   No murmur heard.  Pulmonary/Chest: Effort normal. No respiratory distress. He has rales (bilateral base). He exhibits tenderness (substernal).   Blood tinged sputum at bedside. On 2L NC   Abdominal: Soft. He exhibits no distension and no mass. There is no tenderness. No hernia.   Musculoskeletal: He exhibits no edema or deformity.   Neurological: He is alert and oriented to person, place, and time.   Skin: Skin is warm and dry. No rash noted. He is not diaphoretic.   Psychiatric: He has a normal mood and affect. His behavior is normal.   Vitals reviewed.      Significant Labs:   Recent Lab Results       08/25/19  0817   08/25/19  0623   08/24/19  2216        Albumin 2.8         Alkaline Phosphatase 72         ALT 97         Anion Gap 8         AST 45         Baso # 0.03         Basophil% 0.1         BILIRUBIN TOTAL 0.9  Comment:  For infants and newborns, interpretation of results should be based  on gestational age, weight and in agreement with clinical  observations.  Premature Infant recommended reference ranges:  Up to 24 hours.............<8.0 mg/dL  Up to 48 hours............<12.0 mg/dL  3-5 days..................<15.0 mg/dL  6-29 days.................<15.0 mg/dL           BUN, Bld 12         Calcium 9.1         Chloride 102         CO2 26         Creatinine 0.9         Differential Method Automated         eGFR if  >60.0         eGFR if non  >60.0  Comment:  Calculation used to obtain the estimated glomerular filtration  rate (eGFR) is the CKD-EPI equation.            Eos # 0.0         Eosinophil% 0.0         Glucose 96         Gran # (ANC) 18.4         Gran% 89.9         Hematocrit 42.1         Hemoglobin 14.0         Immature Grans (Abs) 0.24  Comment:  Mild elevation in immature granulocytes is non specific and   can be seen in a variety of conditions  including stress response,   acute inflammation, trauma and pregnancy. Correlation with other   laboratory and clinical findings is essential.           Immature Granulocytes 1.2         Lymph # 1.1         Lymph% 5.2         MCH 31.1         MCHC 33.3         MCV 94         Mono # 0.7         Mono% 3.6         MPV 9.6         nRBC 0         Platelets 193         Potassium 3.9         PROTEIN TOTAL 5.9         RBC 4.50         RDW 12.9         Sodium 136         Troponin I   0.288  Comment:  The reference interval for Troponin I represents the 99th percentile   cutoff   for our facility and is consistent with 3rd generation assay   performance.   0.478  Comment:  The reference interval for Troponin I represents the 99th percentile   cutoff   for our facility and is consistent with 3rd generation assay   performance.       WBC 20.47             All pertinent labs within the past 24 hours have been reviewed.    Significant Imaging: I have reviewed all pertinent imaging results/findings within the past 24 hours.

## 2019-08-25 NOTE — HOSPITAL COURSE
Alka Rowe is a 24 yo M with hx of seizures (non-compliant with meds) who presents as a transfer from [facility] for status epilepticus (?). Per chart review and ED record, patient was out partying night PTA and family found him unresponsive in the morning. 911 was called but no ambulance showed up so family took him to [place] ED. Pt was hypoxic to 70% on arrival and no response to narcan. He was intubated and started on propofol drip to evaluate for ICH. CTH revealed mild diffuse edema. CT chest revealed multifocal pneumonia. Labs revealed leukocytosis of 14.4, lactic acid 11.2, and utox positive for opiates and cocaine. Glucose was in the 60s, he received D50 with improvement to the 130s. Pt was transferred to [facility] intubated and on propofol and dobutamine. Admitted to [facility] for higher level of care.      He was initiated on BSABx and administered lasix with improvement in respiratory status. Extubated to NC on 8/24. Reports chest pain. On admission, did have elevated troponin; EKG non-ischemic. TTE unremarkable. Per [facility], was discussed with Cardiology but felt to be related to sepsis vs cocaine use and no further medical therapy needed at this time. EEG negative for seizures. Suspect presentation 2/2 aspiration pneumonia and sepsis in the setting of opioid/cocaine/etoh overdose/intoxication. Will discharge on augmentin to complete course for aspiration PNA. Problem based discussion below.    Vitals:    08/26/19 1200   BP: 129/70   Pulse: 82   Resp: 18   Temp:      Physical Exam   Constitutional: He is oriented to person, place, and time. He appears well-developed and well-nourished. No distress.   HENT:   Head: Normocephalic and atraumatic.   Nose: Nose normal.   Eyes: Pupils are equal, round, and reactive to light. EOM are normal. No scleral icterus.   Neck: Normal range of motion. No JVD present. No tracheal deviation present.   Cardiovascular: Normal rate, regular rhythm and normal heart sounds. Exam reveals  no gallop and no friction rub.   No murmur heard.  Pulmonary/Chest: Effort normal. No respiratory distress. He has rales (bilateral base, improved). He exhibits tenderness (substernal).   Blood tinged sputum at bedside. Off NC   Abdominal: Soft. He exhibits no distension and no mass. There is no tenderness. No hernia.   Musculoskeletal: He exhibits no edema or deformity.   Neurological: He is alert and oriented to person, place, and time.   Skin: Skin is warm and dry. No rash noted. He is not diaphoretic.   Psychiatric: He has a normal mood and affect. His behavior is normal.   Vitals reviewed.

## 2019-08-25 NOTE — ASSESSMENT & PLAN NOTE
- presented with hypoxic respiratory failure, ? Seizures, and evidence of PNA  - on rocephin/flagyl in ICU with improvement  - transition to augmentin today

## 2019-08-25 NOTE — PROGRESS NOTES
[facility] Hospital Medicine  Progress Note    Patient Name: Alka Rowe  MRN: [id number]  Patient Class: IP- Inpatient   Admission Date: 8/23/2019  Length of Stay: 2 days  Attending Physician: Alessandro Mckeon, *  Primary Care Provider: Primary Doctor [facility] Medicine Team: [facility] HOSP MED R Alessandro Mckeon MD    Subjective:     Principal Problem:Acute respiratory failure with hypoxia        HPI:  Alka Rowe is a 22 yo M with hx of seizures (non-compliant with meds) who presents as a transfer from [facility] for status epilepticus(?). Per chart review and ED record, patient was out partying last night and family found him unresponsive in the morning. 911 was called but no ambulance showed up so family took him to [place] ED. Pt was hypoxic to 70% on arrival and no response to narcan. He was intubated and started on propofol drip to evaluate for ICH. CTH revealed mild diffuse edema. CT chest revealed multifocal pneumonia. Labs revealed leukocytosis of 14.4, lactic acid 11.2, and utox positive for opiates and cocaine. Glucose was in the 60s, he received D50 with improvement to the 130s. Pt was transferred to [facility] intubated and on propofol and dobutamine. Admitted to [facility] for higher level of care.      Overview/Hospital Course:  Alka Rowe is a 22 yo M with hx of seizures (non-compliant with meds) who presents as a transfer from [facility] for status epilepticus(?). Per chart review and ED record, patient was out partying night PTA and family found him unresponsive in the morning. 911 was called but no ambulance showed up so family took him to [place] ED. Pt was hypoxic to 70% on arrival and no response to narcan. He was intubated and started on propofol drip to evaluate for ICH. CTH revealed mild diffuse edema. CT chest revealed multifocal pneumonia. Labs revealed leukocytosis of 14.4, lactic acid 11.2, and utox positive for opiates and cocaine. Glucose was in the 60s,  he received D50 with improvement to the 130s. Pt was transferred to [facility] intubated and on propofol and dobutamine. Admitted to [facility] for higher level of care.      He was initiated on BSABx and administered lasix with improvement in respiratory status. Extubated to NC on 8/24. Reports chest pain. On admission, did have elevated troponin; EKG non-ischemic. TTE unremarkable. Per [facility], was discussed with Cardiology but felt to be related to sepsis vs cocaine use and no further medical therapy needed at this time.    Interval History: reports his breathing has improved, although still somewhat short of breath. Still with persistent chest pain, which is worse with cough and is reproducible. Notes small amount of blood tinged sputum with cough.    Review of Systems   Constitutional: Negative for chills, diaphoresis and fever.   HENT: Negative for congestion and sore throat.    Eyes: Negative for discharge and visual disturbance.   Respiratory: Positive for cough and shortness of breath.    Cardiovascular: Positive for chest pain. Negative for leg swelling.   Gastrointestinal: Negative for abdominal pain, nausea and vomiting.   Genitourinary: Negative for dysuria and flank pain.   Musculoskeletal: Negative for arthralgias and joint swelling.   Skin: Negative for rash and wound.   Allergic/Immunologic: Negative for immunocompromised state.   Neurological: Negative for light-headedness and headaches.   Psychiatric/Behavioral: Negative for agitation and confusion.     Objective:     Vital Signs (Most Recent):  Temp: 99.6 °F (37.6 °C) (08/25/19 1219)  Pulse: 108 (08/25/19 1219)  Resp: (!) 22 (08/25/19 1219)  BP: 107/63 (08/25/19 1219)  SpO2: (!) 91 % (08/25/19 1219) Vital Signs (24h Range):  Temp:  [98.2 °F (36.8 °C)-99.8 °F (37.7 °C)] 99.6 °F (37.6 °C)  Pulse:  [] 108  Resp:  [20-42] 22  SpO2:  [91 %-100 %] 91 %  BP: (107-136)/(56-83) 107/63     Weight: 56.7 kg (125 lb)  Body mass index is 19.01 kg/m².    Intake/Output  Summary (Last 24 hours) at 8/25/2019 1445  Last data filed at 8/25/2019 0600  Gross per 24 hour   Intake 255 ml   Output 1175 ml   Net -920 ml      Physical Exam   Constitutional: He is oriented to person, place, and time. He appears well-developed and well-nourished. No distress.   HENT:   Head: Normocephalic and atraumatic.   Nose: Nose normal.   Eyes: Pupils are equal, round, and reactive to light. EOM are normal. No scleral icterus.   Neck: Normal range of motion. No JVD present. No tracheal deviation present.   Cardiovascular: Normal rate, regular rhythm and normal heart sounds. Exam reveals no gallop and no friction rub.   No murmur heard.  Pulmonary/Chest: Effort normal. No respiratory distress. He has rales (bilateral base). He exhibits tenderness (substernal).   Blood tinged sputum at bedside. On 2L NC   Abdominal: Soft. He exhibits no distension and no mass. There is no tenderness. No hernia.   Musculoskeletal: He exhibits no edema or deformity.   Neurological: He is alert and oriented to person, place, and time.   Skin: Skin is warm and dry. No rash noted. He is not diaphoretic.   Psychiatric: He has a normal mood and affect. His behavior is normal.   Vitals reviewed.      Significant Labs:   Recent Lab Results       08/25/19  0817   08/25/19  0623   08/24/19  2216        Albumin 2.8         Alkaline Phosphatase 72         ALT 97         Anion Gap 8         AST 45         Baso # 0.03         Basophil% 0.1         BILIRUBIN TOTAL 0.9  Comment:  For infants and newborns, interpretation of results should be based  on gestational age, weight and in agreement with clinical  observations.  Premature Infant recommended reference ranges:  Up to 24 hours.............<8.0 mg/dL  Up to 48 hours............<12.0 mg/dL  3-5 days..................<15.0 mg/dL  6-29 days.................<15.0 mg/dL           BUN, Bld 12         Calcium 9.1         Chloride 102         CO2 26         Creatinine 0.9         Differential  Method Automated         eGFR if  >60.0         eGFR if non  >60.0  Comment:  Calculation used to obtain the estimated glomerular filtration  rate (eGFR) is the CKD-EPI equation.            Eos # 0.0         Eosinophil% 0.0         Glucose 96         Gran # (ANC) 18.4         Gran% 89.9         Hematocrit 42.1         Hemoglobin 14.0         Immature Grans (Abs) 0.24  Comment:  Mild elevation in immature granulocytes is non specific and   can be seen in a variety of conditions including stress response,   acute inflammation, trauma and pregnancy. Correlation with other   laboratory and clinical findings is essential.           Immature Granulocytes 1.2         Lymph # 1.1         Lymph% 5.2         MCH 31.1         MCHC 33.3         MCV 94         Mono # 0.7         Mono% 3.6         MPV 9.6         nRBC 0         Platelets 193         Potassium 3.9         PROTEIN TOTAL 5.9         RBC 4.50         RDW 12.9         Sodium 136         Troponin I   0.288  Comment:  The reference interval for Troponin I represents the 99th percentile   cutoff   for our facility and is consistent with 3rd generation assay   performance.   0.478  Comment:  The reference interval for Troponin I represents the 99th percentile   cutoff   for our facility and is consistent with 3rd generation assay   performance.       WBC 20.47             All pertinent labs within the past 24 hours have been reviewed.    Significant Imaging: I have reviewed all pertinent imaging results/findings within the past 24 hours.      Assessment/Plan:      * Acute respiratory failure with hypoxia  -improving, extubated on 8/24 and weaning O2  - CT chest with multifocal pneumonia  -CXR with diffuse opacification in R lung  --VAP precautions  --empiric abx ceftriaxone and flagyl; will de-escalate to augmentin for aspiration PNA coverage  --respiratory cultures NGTD    Elevated troponin  - troponin peak 0.783  - per report, discussed  with Cardiology who felt 2/2 cocaine use vs type 2 in setting of seizure/sepsis  - TTE normal  - ASA  - check lipids  - encourage cocaine cessation    Transaminitis  - likely 2/2 shock in setting of sepsis on admission, now improving  - r/o hepatitis in drug user  - check RUQ US      Aspiration pneumonia  - presented with hypoxic respiratory failure, ? Seizures, and evidence of PNA  - on rocephin/flagyl in ICU with improvement  - transition to augmentin today      Chest pain  - reproducible and pleuritic  - possibly 2/2 pneumonia  - will give APAP prn      Acute metabolic encephalopathy  - resolved  - likely 2/2 sepsis and hypoxia vs intoxication    Opiate abuse, continuous  - utox positive on admission    Cocaine abuse  - utox positive on admission  - counseling provided  - suspect that elevated troponin in setting of vasospasm    Status epilepticus  - initially transferred due to concern for status; however, has resolved at this time  - spot EEG pending      VTE Risk Mitigation (From admission, onward)        Ordered     heparin (porcine) injection 5,000 Units  Every 8 hours      08/23/19 1733     IP VTE HIGH RISK PATIENT  Once      08/23/19 1733                Alessandro Mckeon MD  Department of Hospital Medicine   [facility]

## 2019-08-25 NOTE — ASSESSMENT & PLAN NOTE
- troponin peak 0.783  - per report, discussed with Cardiology who felt 2/2 cocaine use vs type 2 in setting of seizure/sepsis  - TTE normal  - ASA  - check lipids  - encourage cocaine cessation

## 2019-08-25 NOTE — ASSESSMENT & PLAN NOTE
-improving, extubated on 8/24 and weaning O2  - CT chest with multifocal pneumonia  -CXR with diffuse opacification in R lung  --VAP precautions  --empiric abx ceftriaxone and flagyl; will de-escalate to augmentin for aspiration PNA coverage  --respiratory cultures NGTD

## 2019-08-25 NOTE — ASSESSMENT & PLAN NOTE
- initially transferred due to concern for status; however, has resolved at this time  - spot EEG pending

## 2019-08-25 NOTE — PLAN OF CARE
Problem: Adult Inpatient Plan of Care  Goal: Plan of Care Review  Outcome: Ongoing (interventions implemented as appropriate)  POC reviewed with pt at 0530. Pt verbalized understanding. Questions and concerns addressed. No acute events today. Pt has transfer orders to internal medicine floor. Pt progressing toward goals. Will continue to monitor. See flowsheets for full assessment and VS info.

## 2019-08-25 NOTE — PROGRESS NOTES
Nursing Transfer Note       Transfer 1108    Transfer via wheelchair    Transfer with cardiac monitoring    Transported by Fortino Fairbanks RN    Medicines sent: no    Chart sent with patient: YES    Notified: receiving RN    Bedside Neuro assessment performed: Yes    Bedside Handoff given to: receiving RN    Upon arrival to floor: AAOx4, VSS. Tolerated transfer well. Transfer of care complete.

## 2019-08-25 NOTE — ASSESSMENT & PLAN NOTE
- likely 2/2 shock in setting of sepsis on admission, now improving  - r/o hepatitis in drug user  - check RUQ US

## 2019-08-26 VITALS
OXYGEN SATURATION: 98 % | HEART RATE: 82 BPM | HEIGHT: 68 IN | DIASTOLIC BLOOD PRESSURE: 70 MMHG | WEIGHT: 125 LBS | BODY MASS INDEX: 18.94 KG/M2 | TEMPERATURE: 99 F | RESPIRATION RATE: 18 BRPM | SYSTOLIC BLOOD PRESSURE: 129 MMHG

## 2019-08-26 PROBLEM — J96.01 ACUTE RESPIRATORY FAILURE WITH HYPOXIA: Status: RESOLVED | Noted: 2019-08-23 | Resolved: 2019-08-26

## 2019-08-26 PROBLEM — G93.41 ACUTE METABOLIC ENCEPHALOPATHY: Status: RESOLVED | Noted: 2019-08-23 | Resolved: 2019-08-26

## 2019-08-26 LAB
ALBUMIN SERPL BCP-MCNC: 3.1 G/DL (ref 3.5–5.2)
ALP SERPL-CCNC: 74 U/L (ref 55–135)
ALT SERPL W/O P-5'-P-CCNC: 64 U/L (ref 10–44)
ANION GAP SERPL CALC-SCNC: 13 MMOL/L (ref 8–16)
AST SERPL-CCNC: 27 U/L (ref 10–40)
BASOPHILS # BLD AUTO: 0.02 K/UL (ref 0–0.2)
BASOPHILS NFR BLD: 0.1 % (ref 0–1.9)
BILIRUB SERPL-MCNC: 1.3 MG/DL (ref 0.1–1)
BUN SERPL-MCNC: 13 MG/DL (ref 6–20)
CALCIUM SERPL-MCNC: 10 MG/DL (ref 8.7–10.5)
CHLORIDE SERPL-SCNC: 101 MMOL/L (ref 95–110)
CO2 SERPL-SCNC: 23 MMOL/L (ref 23–29)
CREAT SERPL-MCNC: 0.9 MG/DL (ref 0.5–1.4)
DIFFERENTIAL METHOD: ABNORMAL
EOSINOPHIL # BLD AUTO: 0.1 K/UL (ref 0–0.5)
EOSINOPHIL NFR BLD: 0.4 % (ref 0–8)
ERYTHROCYTE [DISTWIDTH] IN BLOOD BY AUTOMATED COUNT: 12.7 % (ref 11.5–14.5)
EST. GFR  (AFRICAN AMERICAN): >60 ML/MIN/1.73 M^2
EST. GFR  (NON AFRICAN AMERICAN): >60 ML/MIN/1.73 M^2
GLUCOSE SERPL-MCNC: 82 MG/DL (ref 70–110)
HAV IGM SERPL QL IA: NEGATIVE
HBV CORE IGM SERPL QL IA: NEGATIVE
HBV SURFACE AG SERPL QL IA: NEGATIVE
HCT VFR BLD AUTO: 45.5 % (ref 40–54)
HCV AB SERPL QL IA: NEGATIVE
HGB BLD-MCNC: 15.2 G/DL (ref 14–18)
IMM GRANULOCYTES # BLD AUTO: 0.12 K/UL (ref 0–0.04)
IMM GRANULOCYTES NFR BLD AUTO: 0.8 % (ref 0–0.5)
LYMPHOCYTES # BLD AUTO: 1.6 K/UL (ref 1–4.8)
LYMPHOCYTES NFR BLD: 11.1 % (ref 18–48)
MCH RBC QN AUTO: 31.3 PG (ref 27–31)
MCHC RBC AUTO-ENTMCNC: 33.4 G/DL (ref 32–36)
MCV RBC AUTO: 94 FL (ref 82–98)
MONOCYTES # BLD AUTO: 0.7 K/UL (ref 0.3–1)
MONOCYTES NFR BLD: 5 % (ref 4–15)
NEUTROPHILS # BLD AUTO: 11.9 K/UL (ref 1.8–7.7)
NEUTROPHILS NFR BLD: 82.6 % (ref 38–73)
NRBC BLD-RTO: 0 /100 WBC
PLATELET # BLD AUTO: 234 K/UL (ref 150–350)
PMV BLD AUTO: 9.4 FL (ref 9.2–12.9)
POCT GLUCOSE: <20 MG/DL (ref 70–110)
POTASSIUM SERPL-SCNC: 4 MMOL/L (ref 3.5–5.1)
PROT SERPL-MCNC: 6.8 G/DL (ref 6–8.4)
RBC # BLD AUTO: 4.86 M/UL (ref 4.6–6.2)
SODIUM SERPL-SCNC: 137 MMOL/L (ref 136–145)
WBC # BLD AUTO: 14.42 K/UL (ref 3.9–12.7)

## 2019-08-26 PROCEDURE — 25000003 PHARM REV CODE 250: Performed by: HOSPITALIST

## 2019-08-26 PROCEDURE — 36415 COLL VENOUS BLD VENIPUNCTURE: CPT

## 2019-08-26 PROCEDURE — 25000003 PHARM REV CODE 250: Performed by: STUDENT IN AN ORGANIZED HEALTH CARE EDUCATION/TRAINING PROGRAM

## 2019-08-26 PROCEDURE — 99239 PR HOSPITAL DISCHARGE DAY,>30 MIN: ICD-10-PCS | Mod: ,,, | Performed by: HOSPITALIST

## 2019-08-26 PROCEDURE — 85025 COMPLETE CBC W/AUTO DIFF WBC: CPT

## 2019-08-26 PROCEDURE — 80053 COMPREHEN METABOLIC PANEL: CPT

## 2019-08-26 PROCEDURE — 25000003 PHARM REV CODE 250: Performed by: NURSE PRACTITIONER

## 2019-08-26 PROCEDURE — 99239 HOSP IP/OBS DSCHRG MGMT >30: CPT | Mod: ,,, | Performed by: HOSPITALIST

## 2019-08-26 RX ORDER — ASPIRIN 325 MG
325 TABLET ORAL DAILY
Refills: 0 | Status: ON HOLD | COMMUNITY
Start: 2019-08-27 | End: 2021-12-18

## 2019-08-26 RX ORDER — ACETAMINOPHEN 325 MG/1
650 TABLET ORAL EVERY 6 HOURS PRN
Refills: 0 | Status: ON HOLD | COMMUNITY
Start: 2019-08-26 | End: 2021-12-18

## 2019-08-26 RX ORDER — AMOXICILLIN AND CLAVULANATE POTASSIUM 875; 125 MG/1; MG/1
1 TABLET, FILM COATED ORAL EVERY 12 HOURS
Qty: 12 TABLET | Refills: 0 | Status: SHIPPED | OUTPATIENT
Start: 2019-08-26 | End: 2019-09-01

## 2019-08-26 RX ADMIN — ASPIRIN 325 MG ORAL TABLET 325 MG: 325 PILL ORAL at 09:08

## 2019-08-26 RX ADMIN — CHLORHEXIDINE GLUCONATE 0.12% ORAL RINSE 15 ML: 1.2 LIQUID ORAL at 12:08

## 2019-08-26 RX ADMIN — AMOXICILLIN AND CLAVULANATE POTASSIUM 1 TABLET: 875; 125 TABLET, FILM COATED ORAL at 09:08

## 2019-08-26 RX ADMIN — FAMOTIDINE 20 MG: 20 TABLET, FILM COATED ORAL at 09:08

## 2019-08-26 NOTE — PLAN OF CARE
08/26/19 1416   Final Note   Assessment Type Final Discharge Note   Anticipated Discharge Disposition Home   What phone number can be called within the next 1-3 days to see how you are doing after discharge? [phone]   Discharge plans and expectations educations in teach back method with documentation complete? Yes   Right Care Referral Info   Post Acute Recommendation No Care

## 2019-08-26 NOTE — ASSESSMENT & PLAN NOTE
- initially transferred due to concern for status; however, has resolved at this time  - spot EEG negative  - suspect even if patient did have seizures, it was provoked 2/2 drug use  - no AED per Neuro

## 2019-08-26 NOTE — DISCHARGE SUMMARY
[facility] Hospital Medicine  Discharge Summary      Patient Name: Alka Rowe  MRN: [id number]  Admission Date: 8/23/2019  Hospital Length of Stay: 3 days  Discharge Date and Time:  08/26/2019 3:19 PM  Attending Physician: Alessandro Mckeon, *   Discharging Provider: Alessandro Mckeon MD  Primary Care Provider: Primary Doctor [facility] Medicine Team: [facility] MED R Alessandro Mckeon MD    HPI:   Alka Rowe is a 22 yo M with hx of seizures (non-compliant with meds) who presents as a transfer from [facility] for status epilepticus(?). Per chart review and ED record, patient was out partying last night and family found him unresponsive in the morning. 911 was called but no ambulance showed up so family took him to [place] ED. Pt was hypoxic to 70% on arrival and no response to narcan. He was intubated and started on propofol drip to evaluate for ICH. CTH revealed mild diffuse edema. CT chest revealed multifocal pneumonia. Labs revealed leukocytosis of 14.4, lactic acid 11.2, and utox positive for opiates and cocaine. Glucose was in the 60s, he received D50 with improvement to the 130s. Pt was transferred to [facility] intubated and on propofol and dobutamine. Admitted to [facility] for higher level of care.      * No surgery found *      Hospital Course:   Alka Rowe is a 22 yo M with hx of seizures (non-compliant with meds) who presents as a transfer from [facility] for status epilepticus (?). Per chart review and ED record, patient was out partying night PTA and family found him unresponsive in the morning. 911 was called but no ambulance showed up so family took him to [place] ED. Pt was hypoxic to 70% on arrival and no response to narcan. He was intubated and started on propofol drip to evaluate for ICH. CTH revealed mild diffuse edema. CT chest revealed multifocal pneumonia. Labs revealed leukocytosis of 14.4, lactic acid 11.2, and utox positive for opiates and cocaine.  Glucose was in the 60s, he received D50 with improvement to the 130s. Pt was transferred to [facility] intubated and on propofol and dobutamine. Admitted to [facility] for higher level of care.      He was initiated on BSABx and administered lasix with improvement in respiratory status. Extubated to NC on 8/24. Reports chest pain. On admission, did have elevated troponin; EKG non-ischemic. TTE unremarkable. Per [facility], was discussed with Cardiology but felt to be related to sepsis vs cocaine use and no further medical therapy needed at this time. EEG negative for seizures. Suspect presentation 2/2 aspiration pneumonia and sepsis in the setting of opioid/cocaine/etoh overdose/intoxication. Will discharge on augmentin to complete course for aspiration PNA. Problem based discussion below.    Vitals:    08/26/19 1200   BP: 129/70   Pulse: 82   Resp: 18   Temp:      Physical Exam   Constitutional: He is oriented to person, place, and time. He appears well-developed and well-nourished. No distress.   HENT:   Head: Normocephalic and atraumatic.   Nose: Nose normal.   Eyes: Pupils are equal, round, and reactive to light. EOM are normal. No scleral icterus.   Neck: Normal range of motion. No JVD present. No tracheal deviation present.   Cardiovascular: Normal rate, regular rhythm and normal heart sounds. Exam reveals no gallop and no friction rub.   No murmur heard.  Pulmonary/Chest: Effort normal. No respiratory distress. He has rales (bilateral base, improved). He exhibits tenderness (substernal).   Blood tinged sputum at bedside. Off NC   Abdominal: Soft. He exhibits no distension and no mass. There is no tenderness. No hernia.   Musculoskeletal: He exhibits no edema or deformity.   Neurological: He is alert and oriented to person, place, and time.   Skin: Skin is warm and dry. No rash noted. He is not diaphoretic.   Psychiatric: He has a normal mood and affect. His behavior is normal.   Vitals reviewed.      Consults:   Consults (From  admission, onward)        Status Ordering Provider     Inpatient consult for Status Epilepticus Management  Once     Provider:  (Not yet assigned)    Acknowledged RAUL TODD          * Aspiration pneumonia  - presented with hypoxic respiratory failure, ? Seizures, and evidence of PNA  - on rocephin/flagyl in ICU with improvement  - transition to augmentin well tolerated; complete 10d course      Elevated troponin  - troponin peak 0.783  - per report, discussed with Cardiology who felt 2/2 cocaine use vs type 2 in setting of seizure/sepsis  - TTE normal  - ASA  - check lipids  - encourage cocaine cessation    Transaminitis  - likely 2/2 shock in setting of sepsis on admission, now improving; can f/u with repeat CMP as outpatient in a few months  - r/o hepatitis in drug user - hep panel negative  - RUQ US unremarkable      Chest pain  - reproducible and pleuritic  - possibly 2/2 pneumonia  - will give APAP prn      Opiate abuse, continuous  - utox positive on admission    Cocaine abuse  - utox positive on admission  - counseling provided  - suspect that elevated troponin in setting of vasospasm    Status epilepticus  - initially transferred due to concern for status; however, has resolved at this time  - spot EEG negative  - suspect even if patient did have seizures, it was provoked 2/2 drug use  - no AED per Neuro    Acute metabolic encephalopathy-resolved as of 8/26/2019  - resolved  - likely 2/2 sepsis and hypoxia vs intoxication    Acute respiratory failure with hypoxia-resolved as of 8/26/2019  - improved, extubated on 8/24 and now off O2  - CT chest with multifocal pneumonia  -CXR with diffuse opacification in R lung  --VAP precautions  --empiric abx ceftriaxone and flagyl; will de-escalate to augmentin for aspiration PNA coverage x10d total  --respiratory cultures NGTD      Final Active Diagnoses:    Diagnosis Date Noted POA    PRINCIPAL PROBLEM:  Aspiration pneumonia [J69.0] 08/25/2019 Yes     Transaminitis [R74.0] 08/25/2019 Yes    Elevated troponin [R74.8] 08/25/2019 Yes    Chest pain [R07.9] 08/24/2019 Yes    Status epilepticus [G40.901] 08/23/2019 Yes    Cocaine abuse [F14.10] 08/23/2019 Yes    Opiate abuse, continuous [F11.10] 08/23/2019 Yes      Problems Resolved During this Admission:    Diagnosis Date Noted Date Resolved POA    Acute respiratory failure with hypoxia [J96.01] 08/23/2019 08/26/2019 Yes    Acute metabolic encephalopathy [G93.41] 08/23/2019 08/26/2019 Yes    Metabolic acidosis [E87.2] 08/23/2019 08/25/2019 Yes    Cardiogenic shock [R57.0] 08/23/2019 08/25/2019 Yes       Discharged Condition: good    Disposition: Home or Self Care    Follow Up:  Follow-up Information     Schedule an appointment as soon as possible for a visit with Primary Care Provider .               Patient Instructions:      Ambulatory Referral to Internal Medicine   Referral Priority: Routine Referral Type: Consultation   Referral Reason: Specialty Services Required   Requested Specialty: Internal Medicine   Number of Visits Requested: 1     Diet Adult Regular     Notify your health care provider if you experience any of the following:  temperature >100.4     Notify your health care provider if you experience any of the following:  persistent nausea and vomiting or diarrhea     Notify your health care provider if you experience any of the following:  severe uncontrolled pain     Notify your health care provider if you experience any of the following:  difficulty breathing or increased cough     Activity as tolerated       Significant Diagnostic Studies: Labs:   CMP   Recent Labs   Lab 08/25/19  0817 08/26/19  1118    137   K 3.9 4.0    101   CO2 26 23   GLU 96 82   BUN 12 13   CREATININE 0.9 0.9   CALCIUM 9.1 10.0   PROT 5.9* 6.8   ALBUMIN 2.8* 3.1*   BILITOT 0.9 1.3*   ALKPHOS 72 74   AST 45* 27   ALT 97* 64*   ANIONGAP 8 13   ESTGFRAFRICA >60.0 >60.0   EGFRNONAA >60.0 >60.0   , CBC   Recent  Labs   Lab 08/25/19  0817 08/26/19  1116   WBC 20.47* 14.42*   HGB 14.0 15.2   HCT 42.1 45.5    234   , INR   Lab Results   Component Value Date    INR 1.2 08/23/2019   , Lipid Panel   Lab Results   Component Value Date    CHOL 71 (L) 08/25/2019    HDL 41 08/25/2019    LDLCALC 17.2 (L) 08/25/2019    TRIG 64 08/25/2019    CHOLHDL 57.7 (H) 08/25/2019   , Troponin   Recent Labs   Lab 08/25/19  0623   TROPONINI 0.288*   , A1C:   Recent Labs   Lab 08/24/19  1003   HGBA1C 5.3    and All labs within the past 24 hours have been reviewed  Microbiology:   Blood Culture   Lab Results   Component Value Date    LABBLOO No Growth to date 08/23/2019    LABBLOO No Growth to date 08/23/2019    LABBLOO No Growth to date 08/23/2019    LABBLOO No Growth to date 08/23/2019    LABBLOO No Growth to date 08/23/2019    LABBLOO No Growth to date 08/23/2019   , Sputum Culture   Lab Results   Component Value Date    GSRESP <10 epithelial cells per low power field. 08/23/2019    GSRESP Rare WBC's 08/23/2019    GSRESP Rare Gram positive cocci 08/23/2019    RESPIRATORYC No Growth 08/23/2019    RESPIRATORYC Further report to follow 08/23/2019    and Urine Culture  No results found for: LABURIN  Radiology: X-Ray: CXR: X-Ray Chest 1 View (CXR):   Results for orders placed or performed during the hospital encounter of 08/23/19   X-Ray Chest 1 View    Narrative    EXAMINATION:  XR CHEST 1 VIEW    CLINICAL HISTORY:  s/p intubation;    TECHNIQUE:  Single frontal view of the chest was performed.    COMPARISON:  08/24/2019    FINDINGS:  Cardiomediastinal silhouette is within normal limits.  Endotracheal tube and NG tube has been removed.  No evidence of pneumothorax.  Continued airspace consolidation throughout the right lung slightly improved from prior.  Left lung is relatively clear and well expanded.  No evidence of large pleural effusion.  Bones demonstrate no acute abnormalities.      Impression    Interval removal of endotracheal tube.   Continued airspace consolidation throughout the right lung slightly improved from prior could represent pneumonia or aspiration.  Given the asymmetry, edema felt less likely.      Electronically signed by: Sudhir Boyle MD  Date:    08/26/2019  Time:    07:38    and X-Ray Chest PA and Lateral (CXR): No results found for this visit on 08/23/19.  Cardiac Graphics: Echocardiogram:   2D echo with color flow doppler:   Results for orders placed or performed during the hospital encounter of 09/09/18   2D echo with color flow doppler   Result Value Ref Range    QEF 65 55 - 65    Diastolic Dysfunction No     Est. PA Systolic Pressure 19.32     Narrative    Date of Procedure: 09/10/2018        TEST DESCRIPTION   Technical Quality: This is a technically adequate study.     Aorta: The aortic root is normal in size, measuring 2.8 cm at sinotubular junction.     Left Atrium: The left atrial volume index is normal, measuring 24.08 cc/m2.     Left Ventricle: The left ventricle is normal in size, with an end-diastolic diameter of 4.6 cm, and an end-systolic diameter of 2.7 cm. LV wall thickness is normal, with the septum and the posterior wall each measuring 0.9 cm across. Relative wall   thickness was normal at 0.39, and the LV mass index was 95.1 g/m2 consistent with normal left ventricular mass. There are no regional wall motion abnormalities. Left ventricular systolic function appears normal. Visually estimated ejection fraction is   60-65%. The LV Doppler derived stroke volume equals 91.0 ccs.     Diastolic indices: E wave velocity 1.4 m/s, E/A ratio 2.8,  msec., E/e' ratio(lat) 9. Diastolic function is normal.     Right Atrium: The right atrium is normal in size, measuring 4.5 cm in length in the apical view.     Right Ventricle: The right ventricle is normal in size measuring 2.1 cm at the base in the apical right ventricle-focused view. Global right ventricular systolic function appears normal. The estimated PA  systolic pressure is 19 mmHg.     Aortic Valve:  Aortic valve is normal in structure with normal leaflet mobility.     Mitral Valve:  Mitral valve is normal in structure with normal leaflet mobility.     Tricuspid Valve:  Tricuspid valve is normal in structure with normal leaflet mobility.     Pulmonary Valve:  Pulmonary valve is normal in structure with normal leaflet mobility.     IVC: IVC is normal in size and collapses > 50% with a sniff, suggesting normal right atrial pressure of 3 mmHg.     Intracavitary: There is no evidence of pericardial effusion, intracavity mass, thrombi, or vegetation.         CONCLUSIONS     1 - No wall motion abnormalities.     2 - Normal left ventricular systolic function (EF 60-65%).     3 - Normal left ventricular diastolic function.     4 - Normal right ventricular systolic function .     5 - The estimated PA systolic pressure is 19 mmHg.             This document has been electronically    SIGNED BY: Pedro Dutton MD On: 09/11/2018 10:30    and Transthoracic echo (TTE) complete (Cupid Only):   Results for orders placed or performed during the hospital encounter of 08/23/19   Transthoracic echo (TTE) 2D with Color Flow   Result Value Ref Range    Ascending aorta 2.29 cm    STJ 2.39 cm    IVRT 0.05 msec    IVS 0.58 (A) 0.6 - 1.1 cm    LA size 2.00 cm    LVIDD 4.83 3.5 - 6.0 cm    LVIDS 3.20 2.1 - 4.0 cm    LVOT diameter 1.94 cm    LVOT peak VTI 16.97 cm    PW 0.65 0.6 - 1.1 cm    MV Peak A Jaelyn 0.80 m/s    E wave decelartion time 88.99 msec    MV Peak E Jaelyn 0.84 m/s    RVDD 3.14 cm    Sinus 2.73 cm    TAPSE 2.62 cm    LV Diastolic Volume 109.08 mL    LV Systolic Volume 41.09 mL    LVOT peak jaelyn 1.21 m/s    FS 34 %    LV mass 92.01 g    Left Ventricle Relative Wall Thickness 0.27 cm    E/A ratio 1.05     LVOT area 3.0 cm2    LVOT stroke volume 50.14 cm3    LV Systolic Volume Index 24.5 mL/m2    LV Diastolic Volume Index 65.17 mL/m2    LV Mass Index 55 g/m2    BSA 1.65 m2     Narrative    · Normal left ventricular systolic function. The estimated ejection   fraction is 55%  · Normal LV diastolic function.  · No wall motion abnormalities.  · Normal right ventricular systolic function.  · Normal central venous pressure (3 mm Hg).          Pending Diagnostic Studies:     None         Medications:  Reconciled Home Medications:      Medication List      START taking these medications    acetaminophen 325 MG tablet  Commonly known as:  TYLENOL  Take 2 tablets (650 mg total) by mouth every 6 (six) hours as needed.     amoxicillin-clavulanate 875-125mg 875-125 mg per tablet  Commonly known as:  AUGMENTIN  Take 1 tablet by mouth every 12 (twelve) hours. for 6 days     aspirin 325 MG tablet  Take 1 tablet (325 mg total) by mouth once daily.  Start taking on:  8/27/2019        STOP taking these medications    ibuprofen 600 MG tablet  Commonly known as:  ADVIL,MOTRIN            Indwelling Lines/Drains at time of discharge:   Lines/Drains/Airways          None          Time spent on the discharge of patient: 40 minutes  Patient was seen and examined on the date of discharge and determined to be suitable for discharge.         Alessandro Mckeon MD  Department of Hospital Medicine  [facility]

## 2019-08-26 NOTE — PLAN OF CARE
Problem: Adult Inpatient Plan of Care  Goal: Plan of Care Review  Outcome: Ongoing (interventions implemented as appropriate)  Pt aaox4. Vss. No apparent acute distress. No complaints at this time. Instructed to call for assistance when ambulating. Medications reviewed. Free of falls and injuries this shift. Side rails up x2. Bed in lowest position. Call bell within reach. Will continue to monitor.

## 2019-08-26 NOTE — PLAN OF CARE
Problem: Adult Inpatient Plan of Care  Goal: Plan of Care Review  Outcome: Ongoing (interventions implemented as appropriate)  Report from Kelly FALK. Noted patient in bed awake, alert, and oriented. Denies pain and dyspnea. Bed in lowest position and call light within reach. Board updated and patient encouraged to notify staff when he needs assistance. Denies questions. Patient agrees to have labs drawn at this time. Lab notified and stated she will come draw patients morning labs. Assessments per flow sheets. Will continue to monitor.

## 2019-08-26 NOTE — PROCEDURES
EEG REPORT      Alka Rowe  [id number]  1995    DATE OF SERVICE: 8/25/2019         METHODOLOGY      Extended electroencephalographic recording is made while the patient is ambulatory and continuing normal daily activities.  Electrodes are placed according to the International 10-20 placement system and included T1 and T2 electrode placement.  Twenty four (24) channels of digital signal (sampling rate of 512/sec) was simultaneously recorded from the scalp including EKG and eye monitors.  Recording band pass was 0.1 to 100 hz and all data was stored digitally on the recorder.  The patient is instructed to press an event button when clinical symptoms occur and write the symptoms into a diary. Activation procedures which include photic stimulation, hyperventilation and instructing patients to perform simple task are done in selected patients.        The EEG is displayed on a monitor screen and can be reformatted into different montages for evaluation.  The entire recoding is submitted for computer assisted analysis to detect spike and electrographic seizure activity.  The entire recording is visually reviewed and the times identified by computer analysis as being spikes or seizures are reviewed again.  Compresses spectral analysis (CSA) is also performed on the activity recorded from each individual channel.  This is displayed as a power display of frequencies from 0 to 30 Hz over time.   The CSA analysis is done and displayed continuously.  This is reviewed for asymmetries in power between homologous areas of the scalp and for presence of changes in power which canbe seen when seizures occur.  Sections of suspected abnormalities on the CSA is then compared with the original EEG recording.  .     [employer] software was also utilized in the review of this study.  This software suite analyzes the EEG recording in multiple domains.  Coherence and rhythmicity is computed to identify EEG sections which may  contain organized seizures.  Each channel undergoes analysis to detect presence of spike and sharp waves which have special and morphological characteristic of epileptic activity.  The routine EEG recording is converted from spacial into frequency domain.  This is then displayed comparing homologous areas to identify areas of significant asymmetry.  Algorithm to identify non-cortically generated artifact is used to separate eye movement, EMG and other artifact from the EEG     Recording Times    A total of 00:50:11 hours of ambulatory EEG was recorded.      EEG FINDINGS:  Background activity:   The background rhythm was characterized by alpha and anterior dominant beta activity with a 10Hz posterior dominant alpha rhythm at 30-70 microvolts.   Symmetry and continuity: the background was continuous and symmetric     Sleep:   Normal sleep transients including sleep spindles, K complexes, vertex waves were seen.    Abnormal activity:   No epileptiform discharges, periodic discharges, lateralized rhythmic delta activity or electrographic seizures were seen.    IMPRESSION:   This is a normal EEG of the awake and asleep states.    Timothy Ramirez MD.  Neurology-Epilepsy.  [facility].

## 2019-08-26 NOTE — ASSESSMENT & PLAN NOTE
- likely 2/2 shock in setting of sepsis on admission, now improving; can f/u with repeat CMP as outpatient in a few months  - r/o hepatitis in drug user - hep panel negative  - RUQ US unremarkable

## 2019-08-26 NOTE — PLAN OF CARE
08/26/19 1146   Discharge Assessment   Assessment Type Discharge Planning Assessment   Confirmed/corrected address and phone number on facesheet? Yes   Assessment information obtained from? Patient   Expected Length of Stay (days) 3   Communicated expected length of stay with patient/caregiver yes   Prior to hospitilization cognitive status: Alert/Oriented   Prior to hospitalization functional status: Independent   Current cognitive status: Alert/Oriented   Current Functional Status: Independent   Lives With parent(s)   Able to Return to Prior Arrangements yes   Is patient able to care for self after discharge? Yes   Who are your caregiver(s) and their phone number(s)? Patty [phone]   Patient's perception of discharge disposition home or selfcare   Readmission Within the Last 30 Days no previous admission in last 30 days   Patient currently being followed by outpatient case management? No   Patient currently receives any other outside agency services? No   Equipment Currently Used at Home none   Do you have any problems affording any of your prescribed medications? No   Is the patient taking medications as prescribed? yes   Does the patient have transportation home? Yes   Transportation Anticipated family or friend will provide   Does the patient receive services at the [facility]mad[facility]? No   Discharge Plan A Home with family   Discharge Plan B Home with family   DME Needed Upon Discharge  none   Patient/Family in Agreement with Plan yes

## 2019-08-26 NOTE — ASSESSMENT & PLAN NOTE
- presented with hypoxic respiratory failure, ? Seizures, and evidence of PNA  - on rocephin/flagyl in ICU with improvement  - transition to augmentin well tolerated; complete 10d course

## 2019-08-26 NOTE — ASSESSMENT & PLAN NOTE
- improved, extubated on 8/24 and now off O2  - CT chest with multifocal pneumonia  -CXR with diffuse opacification in R lung  --VAP precautions  --empiric abx ceftriaxone and flagyl; will de-escalate to augmentin for aspiration PNA coverage x10d total  --respiratory cultures NGTD

## 2019-08-27 LAB
BACTERIA SPEC AEROBE CULT: ABNORMAL
BACTERIA SPEC AEROBE CULT: ABNORMAL
GRAM STN SPEC: ABNORMAL

## 2019-08-28 ENCOUNTER — PATIENT OUTREACH (OUTPATIENT)
Dept: ADMINISTRATIVE | Facility: CLINIC | Age: 24
End: 2019-08-28

## 2019-08-28 LAB
BACTERIA BLD CULT: NORMAL
BACTERIA BLD CULT: NORMAL

## 2019-08-28 NOTE — PROGRESS NOTES
C3 nurse attempted to contact patient. No answer. The following message was left for the patient to return the call:  Good Morning I am a nurse calling on behalf of [facility] from the [facility].  This is a Transitional Care Call for Alka Rowe  When you have a moment please contact us at [phone] or [phone] Monday through Friday, between the hours of 8 am to 4 pm. We look forward to speaking with you. On behalf of [facility] have a nice day.    The patient does not have a scheduled HOSFU appointment within 7-14 days post hospital discharge date 08/26/2019. Non [facility] PCP.

## 2020-06-24 ENCOUNTER — HOSPITAL ENCOUNTER (EMERGENCY)
Facility: HOSPITAL | Age: 25
Discharge: HOME | End: 2020-06-24
Attending: EMERGENCY MEDICINE
Payer: MEDICAID

## 2020-06-24 VITALS
DIASTOLIC BLOOD PRESSURE: 84 MMHG | SYSTOLIC BLOOD PRESSURE: 135 MMHG | RESPIRATION RATE: 20 BRPM | OXYGEN SATURATION: 97 % | HEART RATE: 107 BPM

## 2020-06-24 DIAGNOSIS — S02.2XXA CLOSED FRACTURE OF NASAL BONE, INITIAL ENCOUNTER: ICD-10-CM

## 2020-06-24 DIAGNOSIS — R56.9 SEIZURE-LIKE ACTIVITY: Primary | ICD-10-CM

## 2020-06-24 LAB
ALBUMIN SERPL BCP-MCNC: 5.2 G/DL (ref 3.5–5.2)
ALP SERPL-CCNC: 89 U/L (ref 55–135)
ALT SERPL W/O P-5'-P-CCNC: 20 U/L (ref 10–44)
AMPHET+METHAMPHET UR QL: NORMAL
ANION GAP SERPL CALC-SCNC: 29 MMOL/L (ref 8–16)
AST SERPL-CCNC: 50 U/L (ref 10–40)
BACTERIA #/AREA URNS HPF: ABNORMAL /HPF
BARBITURATES UR QL SCN>200 NG/ML: NEGATIVE
BASOPHILS # BLD AUTO: 0.04 K/UL (ref 0–0.2)
BASOPHILS NFR BLD: 0.4 % (ref 0–1.9)
BENZODIAZ UR QL SCN>200 NG/ML: NEGATIVE
BILIRUB SERPL-MCNC: 1.1 MG/DL (ref 0.1–1)
BILIRUB UR QL STRIP: NEGATIVE
BUN SERPL-MCNC: 8 MG/DL (ref 6–20)
BZE UR QL SCN: NORMAL
CALCIUM SERPL-MCNC: 10.3 MG/DL (ref 8.7–10.5)
CANNABINOIDS UR QL SCN: NORMAL
CHLORIDE SERPL-SCNC: 103 MMOL/L (ref 95–110)
CLARITY UR: CLEAR
CO2 SERPL-SCNC: 13 MMOL/L (ref 23–29)
COLOR UR: YELLOW
CREAT SERPL-MCNC: 1.4 MG/DL (ref 0.5–1.4)
CREAT UR-MCNC: 116.5 MG/DL (ref 23–375)
DIFFERENTIAL METHOD: ABNORMAL
EOSINOPHIL # BLD AUTO: 0.1 K/UL (ref 0–0.5)
EOSINOPHIL NFR BLD: 0.9 % (ref 0–8)
ERYTHROCYTE [DISTWIDTH] IN BLOOD BY AUTOMATED COUNT: 11.9 % (ref 11.5–14.5)
EST. GFR  (AFRICAN AMERICAN): >60 ML/MIN/1.73 M^2
EST. GFR  (NON AFRICAN AMERICAN): >60 ML/MIN/1.73 M^2
GLUCOSE SERPL-MCNC: 92 MG/DL (ref 70–110)
GLUCOSE UR QL STRIP: NEGATIVE
HCT VFR BLD AUTO: 52 % (ref 40–54)
HGB BLD-MCNC: 16.6 G/DL (ref 14–18)
HGB UR QL STRIP: ABNORMAL
HYALINE CASTS #/AREA URNS LPF: 0 /LPF
IMM GRANULOCYTES # BLD AUTO: 0.02 K/UL (ref 0–0.04)
IMM GRANULOCYTES NFR BLD AUTO: 0.2 % (ref 0–0.5)
KETONES UR QL STRIP: NEGATIVE
LEUKOCYTE ESTERASE UR QL STRIP: NEGATIVE
LYMPHOCYTES # BLD AUTO: 4.9 K/UL (ref 1–4.8)
LYMPHOCYTES NFR BLD: 47 % (ref 18–48)
MCH RBC QN AUTO: 32 PG (ref 27–31)
MCHC RBC AUTO-ENTMCNC: 31.9 G/DL (ref 32–36)
MCV RBC AUTO: 100 FL (ref 82–98)
METHADONE UR QL SCN>300 NG/ML: NEGATIVE
MICROSCOPIC COMMENT: ABNORMAL
MONOCYTES # BLD AUTO: 1.8 K/UL (ref 0.3–1)
MONOCYTES NFR BLD: 17.5 % (ref 4–15)
NEUTROPHILS # BLD AUTO: 3.6 K/UL (ref 1.8–7.7)
NEUTROPHILS NFR BLD: 34 % (ref 38–73)
NITRITE UR QL STRIP: NEGATIVE
NRBC BLD-RTO: 0 /100 WBC
OPIATES UR QL SCN: NEGATIVE
PCP UR QL SCN>25 NG/ML: NEGATIVE
PH UR STRIP: 6 [PH] (ref 5–8)
PLATELET # BLD AUTO: 311 K/UL (ref 150–350)
PMV BLD AUTO: 9.8 FL (ref 9.2–12.9)
POCT GLUCOSE: 78 MG/DL (ref 70–110)
POTASSIUM SERPL-SCNC: 3.3 MMOL/L (ref 3.5–5.1)
PROT SERPL-MCNC: 9.2 G/DL (ref 6–8.4)
PROT UR QL STRIP: ABNORMAL
RBC # BLD AUTO: 5.18 M/UL (ref 4.6–6.2)
RBC #/AREA URNS HPF: 5 /HPF (ref 0–4)
SODIUM SERPL-SCNC: 145 MMOL/L (ref 136–145)
SP GR UR STRIP: >=1.03 (ref 1–1.03)
TOXICOLOGY INFORMATION: NORMAL
URN SPEC COLLECT METH UR: ABNORMAL
UROBILINOGEN UR STRIP-ACNC: NEGATIVE EU/DL
WBC # BLD AUTO: 10.52 K/UL (ref 3.9–12.7)
WBC #/AREA URNS HPF: 8 /HPF (ref 0–5)

## 2020-06-24 PROCEDURE — 99284 EMERGENCY DEPT VISIT MOD MDM: CPT | Mod: 25

## 2020-06-24 PROCEDURE — 96374 THER/PROPH/DIAG INJ IV PUSH: CPT

## 2020-06-24 PROCEDURE — 80053 COMPREHEN METABOLIC PANEL: CPT

## 2020-06-24 PROCEDURE — 81000 URINALYSIS NONAUTO W/SCOPE: CPT | Mod: 59

## 2020-06-24 PROCEDURE — 85025 COMPLETE CBC W/AUTO DIFF WBC: CPT

## 2020-06-24 PROCEDURE — 90715 TDAP VACCINE 7 YRS/> IM: CPT | Mod: SL | Performed by: EMERGENCY MEDICINE

## 2020-06-24 PROCEDURE — 82962 GLUCOSE BLOOD TEST: CPT

## 2020-06-24 PROCEDURE — 90471 IMMUNIZATION ADMIN: CPT | Mod: VFC | Performed by: EMERGENCY MEDICINE

## 2020-06-24 PROCEDURE — 63600175 PHARM REV CODE 636 W HCPCS: Performed by: EMERGENCY MEDICINE

## 2020-06-24 PROCEDURE — 80307 DRUG TEST PRSMV CHEM ANLYZR: CPT

## 2020-06-24 RX ORDER — ONDANSETRON 2 MG/ML
4 INJECTION INTRAMUSCULAR; INTRAVENOUS
Status: COMPLETED | OUTPATIENT
Start: 2020-06-24 | End: 2020-06-24

## 2020-06-24 RX ORDER — ONDANSETRON 4 MG/1
4 TABLET, ORALLY DISINTEGRATING ORAL EVERY 8 HOURS PRN
Qty: 9 TABLET | Refills: 0 | Status: SHIPPED | OUTPATIENT
Start: 2020-06-24 | End: 2020-06-27

## 2020-06-24 RX ORDER — IBUPROFEN 800 MG/1
800 TABLET ORAL EVERY 6 HOURS PRN
Qty: 12 TABLET | Refills: 0 | Status: SHIPPED | OUTPATIENT
Start: 2020-06-24 | End: 2020-06-27

## 2020-06-24 RX ADMIN — CLOSTRIDIUM TETANI TOXOID ANTIGEN (FORMALDEHYDE INACTIVATED), CORYNEBACTERIUM DIPHTHERIAE TOXOID ANTIGEN (FORMALDEHYDE INACTIVATED), BORDETELLA PERTUSSIS TOXOID ANTIGEN (GLUTARALDEHYDE INACTIVATED), BORDETELLA PERTUSSIS FILAMENTOUS HEMAGGLUTININ ANTIGEN (FORMALDEHYDE INACTIVATED), BORDETELLA PERTUSSIS PERTACTIN ANTIGEN, AND BORDETELLA PERTUSSIS FIMBRIAE 2/3 ANTIGEN 0.5 ML: 5; 2; 2.5; 5; 3; 5 INJECTION, SUSPENSION INTRAMUSCULAR at 02:06

## 2020-06-24 RX ADMIN — ONDANSETRON 4 MG: 2 INJECTION INTRAMUSCULAR; INTRAVENOUS at 02:06

## 2020-06-24 NOTE — ED NOTES
Pt is awake and oriented at this time. Family at bedside. Provided pt with scrub top as his shirt is wet from sweating earlier today.

## 2020-06-24 NOTE — ED NOTES
Provided pt and family member with juice and water per request. Pt is more alert at this time, confused to situation. Call bell in reach.

## 2020-06-24 NOTE — ED NOTES
Pt is alert and disoriented. Pt family at bedside states pt had a trip and fall at the top of the stairs at home. He fell down 14 stairs and possibly hit his face on a glass picture frame. There was no LOC witnessed however pt was witnessed to have a seizure for approximately 2 minutes after he fell. Pt has a hx of seizures but family is unsure if he takes medications. Pt is awake to voice. Noted to have bleeding from multiple small lacerations on his face and nose is disfigured. No bleeding in his mouth at this time.

## 2020-06-24 NOTE — CONSULTS
"[facility] Plan of Care    This is a 25yo M w hx of provoked seizures 2/2 to polysubstance abuse back in 08/2019, hx of cocaine, ETOH abuse, and opiate abuse, presented today after tripping down flight of stairs and had 2 witnessed "seizure activity," possibly lasting for 2 minutes. Unclear seizure characteristics.   Currently post-ictal but becoming more alert, no focal neurologic deficits per ED.    Metabolic labs unremarkable, tox pending.  CTH without acute intracranial abnormalities.    Chart review, patient w + ethanol level in past, noted to have polysubstance abuse.    If patient endorses drugs/+ tox screen, this would be provoked seizure and with patient's young age and not a lot of medical comorbidities, would not be inclined to treat with AED. Needs to followup with PCP for polysubstance abuse.     However, if tox is negative and patient denies drug use or other provoking factors, can treat with Keppra 500mg Bid and followup with neurology as outpatient, at that time can consider getting MRI brain without contrast (epilepsy protocol) and a routine EEG.   (EEG back in 2019 normal).    Please call with further questions, no further recommendations at this time.    Windy Espinoza MD  [facility]    "

## 2020-06-24 NOTE — ED PROVIDER NOTES
Encounter Date: 6/24/2020    SCRIBE #1 NOTE: I, Norma Gordon, am scribing for, and in the presence of,  Dr. Cristiana Farris. I have scribed the entire note.       History     Chief Complaint   Patient presents with    Fall     Pt presents to ED carried in by family member who reports pt tripped and fell down a flight of stairs while inside home, hit head on bottom step or on picture frame. Family member reports pt stood up and went to restroom than began to display localized seizure, pt has hx of seizures and is non complaint with medication. family member reports seizure activity increased when en route to ED.  laceration noted to nose, pt post ictal upon arrival. Family member at bedside provided hx     Seizures     Alka Rowe is a 24 y.o. male who  has a past medical history of Renal disorder and Seizures.    The patient presents to the ED due to fall and seizure activity prior to arrival. Per family member, patient tripped and feel down stairs in home, hitting his head either on a picture frame or at the bottom of stairwell. . After fall, patient got up to go to the restroom and had first episode of generalized shaking. He later had two more episodes when getting into vehicle and en route to ED. Patient does have a history of seizures, however is not currently taking any medications for it.     The history is provided by a relative. No  was used.     Review of patient's allergies indicates:  No Known Allergies  Past Medical History:   Diagnosis Date    Renal disorder     Seizures      Past Surgical History:   Procedure Laterality Date    HAND SURGERY       No family history on file.  Social History     Tobacco Use    Smoking status: Current Every Day Smoker     Packs/day: 0.50     Types: Cigarettes    Smokeless tobacco: Never Used   Substance Use Topics    Alcohol use: No    Drug use: Yes     Types: Marijuana, Cocaine     Review of Systems   Constitutional: Negative for chills,  fatigue and fever.   HENT: Negative for facial swelling, trouble swallowing and voice change.    Respiratory: Negative for cough, choking and shortness of breath.    Cardiovascular: Negative for chest pain, palpitations and leg swelling.   Gastrointestinal: Negative for abdominal pain, diarrhea, nausea and vomiting.   Genitourinary: Negative for dysuria, frequency and urgency.   Musculoskeletal: Negative for back pain, neck pain and neck stiffness.   Neurological: Positive for seizures. Negative for speech difficulty, light-headedness, numbness and headaches.   All other systems reviewed and are negative.      Physical Exam     Initial Vitals [06/24/20 1430]   BP Pulse Resp Temp SpO2   (!) 143/80 106 14 -- (!) 93 %      MAP       --         Physical Exam    Nursing note and vitals reviewed.  Constitutional: He appears well-developed and well-nourished. No distress.   HENT:   Head: Normocephalic and atraumatic.   Mouth/Throat: Oropharynx is clear and moist.   1 cm laceration above right eyebrow, 1 cm across the nasal bridge, and an abrasion to top lip.   Eyes: Conjunctivae and EOM are normal. Pupils are equal, round, and reactive to light.   Neck: Normal range of motion. Neck supple.   Cardiovascular: Normal rate, regular rhythm, normal heart sounds and intact distal pulses.   Pulmonary/Chest: Breath sounds normal. No respiratory distress. He has no wheezes. He has no rhonchi. He has no rales.   Abdominal: Soft. Bowel sounds are normal. He exhibits no distension. There is no abdominal tenderness.   Musculoskeletal: Normal range of motion. No tenderness or edema.   Neurological: He is alert and oriented to person, place, and time. He has normal strength. No cranial nerve deficit.   Skin: Skin is warm and dry. Capillary refill takes less than 2 seconds.         ED Course   Procedures  Labs Reviewed   CBC W/ AUTO DIFFERENTIAL - Abnormal; Notable for the following components:       Result Value    Mean Corpuscular Volume  100 (*)     Mean Corpuscular Hemoglobin 32.0 (*)     Mean Corpuscular Hemoglobin Conc 31.9 (*)     Lymph # 4.9 (*)     Mono # 1.8 (*)     Gran% 34.0 (*)     Mono% 17.5 (*)     All other components within normal limits   COMPREHENSIVE METABOLIC PANEL - Abnormal; Notable for the following components:    Potassium 3.3 (*)     CO2 13 (*)     Total Protein 9.2 (*)     Total Bilirubin 1.1 (*)     AST 50 (*)     Anion Gap 29 (*)     All other components within normal limits   URINALYSIS, REFLEX TO URINE CULTURE - Abnormal; Notable for the following components:    Specific Gravity, UA >=1.030 (*)     Protein, UA 2+ (*)     Occult Blood UA 2+ (*)     All other components within normal limits    Narrative:     Preferred Collection Type->Urine, Clean Catch  Specimen Source->Urine   URINALYSIS MICROSCOPIC - Abnormal; Notable for the following components:    RBC, UA 5 (*)     WBC, UA 8 (*)     All other components within normal limits    Narrative:     Preferred Collection Type->Urine, Clean Catch  Specimen Source->Urine   DRUG SCREEN PANEL, URINE EMERGENCY    Narrative:     Preferred Collection Type->Urine, Clean Catch  Specimen Source->Urine   POCT GLUCOSE          X-Rays:   Independently Interpreted Readings:   Other Readings:  Reviewed by myself, read by radiology.     Imaging Results          CT Maxillofacial Without Contrast (Final result)  Result time 06/24/20 14:55:24    Final result by Max Estrada MD (06/24/20 14:55:24)                 Impression:      1. Depressed and mildly displaced right nasal bone fracture as above.  2. No acute intracranial abnormalities.  3. Minimal left maxillary sinus disease.      Electronically signed by: Max Estrada MD  Date:    06/24/2020  Time:    14:55             Narrative:    EXAMINATION:  CT HEAD WITHOUT CONTRAST; CT MAXILLOFACIAL WITHOUT CONTRAST    CLINICAL HISTORY:  Altered mental status;; Nasal fracture suspected;    TECHNIQUE:  Low dose axial images were obtained  through the head.  Coronal and sagittal reformations were also performed. Contrast was not administered.  Axial images of the maxillofacial region were obtained at 1.25 mm intervals without administration of IV contrast.  Coronal and sagittal reformatted images were reviewed.    COMPARISON:  08/23/2019    FINDINGS:  There is no evidence of acute major vascular territory infarct, hemorrhage, or mass.  There is no hydrocephalus.  There are no abnormal extra-axial fluid collections.  No acute displaced calvarial fracture.    There is an acute depressed and mildly displaced fracture involving the right aspect of the nasal bone with overlying nasal edema.  The bilateral mandibular condyles are in appropriate location.  The bilateral orbital walls and maxillary sinus walls are intact.  The zygomatic arches are intact.  The mandible is intact.  The visualized cervical spine is intact.  The bilateral globes and orbital content is unremarkable.  There is minimal mucous membrane thickening of the left maxillary sinus.  The soft tissues of the neck are grossly unremarkable.                               CT Head Without Contrast (Final result)  Result time 06/24/20 14:55:24    Final result by Max Estrada MD (06/24/20 14:55:24)                 Impression:      1. Depressed and mildly displaced right nasal bone fracture as above.  2. No acute intracranial abnormalities.  3. Minimal left maxillary sinus disease.      Electronically signed by: Max Estrada MD  Date:    06/24/2020  Time:    14:55             Narrative:    EXAMINATION:  CT HEAD WITHOUT CONTRAST; CT MAXILLOFACIAL WITHOUT CONTRAST    CLINICAL HISTORY:  Altered mental status;; Nasal fracture suspected;    TECHNIQUE:  Low dose axial images were obtained through the head.  Coronal and sagittal reformations were also performed. Contrast was not administered.  Axial images of the maxillofacial region were obtained at 1.25 mm intervals without administration of IV  contrast.  Coronal and sagittal reformatted images were reviewed.    COMPARISON:  08/23/2019    FINDINGS:  There is no evidence of acute major vascular territory infarct, hemorrhage, or mass.  There is no hydrocephalus.  There are no abnormal extra-axial fluid collections.  No acute displaced calvarial fracture.    There is an acute depressed and mildly displaced fracture involving the right aspect of the nasal bone with overlying nasal edema.  The bilateral mandibular condyles are in appropriate location.  The bilateral orbital walls and maxillary sinus walls are intact.  The zygomatic arches are intact.  The mandible is intact.  The visualized cervical spine is intact.  The bilateral globes and orbital content is unremarkable.  There is minimal mucous membrane thickening of the left maxillary sinus.  The soft tissues of the neck are grossly unremarkable.                              Medical Decision Making:   Initial Assessment:   24 year old male with a history of seizures presents to the ED complaining of fall and seizures prior to arrival. The patient was seen and examined. The history and physical exam was obtained. The nursing notes and vital signs were reviewed.     Secondary to symptoms and exam findings we ordered:    Labs: CBC, CMP, POCT glucose, drug screen panel, urinalysis    Imaging: CT head and CT maxillofacial     Patient will be administered ondansetron and Tdap vaccine.  Patient will be monitored here.    Clinical Tests:   Lab Tests: Ordered and Reviewed  Radiological Study: Ordered and Reviewed  ED Management:  - care of pt assumed by self, from Dr. Farris, as of shift change  - no change in mental status, additional seizure activity while patient was under my care  - UDS positive for amphetamines, THC, cocaine  - case discussed with neurology who did not recommend initiating AEDs as pt's UDS was positive; recommend pt f/u with neurology as OP for likely re-evaluation, MRI possibly  - pt in  agreement with plan for discharge  - pt counseled on drug use cessation   - No further intervention is indicated at this time after having taken into account the patient's history, physical exam findings, and empirical and objective data obtained during the patient's emergency department workup.   - The patient is at low risk for an emergent medical condition at this time, and I am of the belief that that it is safe to discharge the patient from the emergency department.   - The patient is instructed to follow up as outpatient as indicated on the discharge paperwork.    - I have discussed the specifics of the workup with the patient and the patient has verbalized understanding of the details of the workup, the diagnosis, the treatment plan, and the need for outpatient follow-up.    - Although the patient has no emergent etiology today this does not preclude the development of an emergent condition so, in addition, I have advised the patient that they can return to the ED and/or activate EMS at any time with worsening of their symptoms, change of their symptoms, or with any other medical complaint.    - The patient remained comfortable and stable during their visit in the ED.    - Discharge and follow-up instructions discussed with the patient who expressed understanding and willingness to comply with my recommendations.  - Results of all emergency department tests  discussed thoroughly with patient; all patient questions answered; pt in agreement with plan  - Pt instructed to follow up with PCP in 2-3 days for recheck of today's complaints  - Pt given strict emergency department return precautions for any new or worsening of symptoms  - Pt discharged from the emergency department in stable condition, in no acute distress                     ED Course as of Jun 29 1628 Wed Jun 24, 2020   1433 POCT Glucose: 78 [LD]   1542 Discussed with Neurology resident on call Dr Espinoza who recommended following up UDS to  determine whether to start patient on AEDs.  If UDS negative then likely seizure was unprovoked and can start patient on keppra 500 mg BID.  If UDS + for anything then suspect seizure provoked and AEDs not necessary at this time.  Either way patient can follow up as outpatient in [facility] neurology clinic and have outpatient MRI    [LD]   1729 Care patient is seen by self from  as of shift change; UDS positive for THC, amphetamines, cocaine metabolite; discussed findings with pt. No AEDs at this time per neurology; will have pt f/u with neurology for further eval (and possible OP MRI) and ENT (for nasal fx) as OP.Pt verbalized understanding of plan and is agreeable.     [LC]      ED Course User Index  [LC] Izaiah Stinson MD  [LD] Cristiana Farris MD                Clinical Impression:       ICD-10-CM ICD-9-CM   1. Seizure-like activity  R56.9 780.39   2. Closed fracture of nasal bone, initial encounter  S02.2XXA 802.0             ED Disposition Condition    Discharge Stable        ED Prescriptions     Medication Sig Dispense Start Date End Date Auth. Provider    ibuprofen (ADVIL,MOTRIN) 800 MG tablet () Take 1 tablet (800 mg total) by mouth every 6 (six) hours as needed. 12 tablet 2020 Izaiah Stinson MD    ondansetron (ZOFRAN-ODT) 4 MG TbDL () Take 1 tablet (4 mg total) by mouth every 8 (eight) hours as needed (nausea). 9 tablet 2020 Izaiah Stinson MD        Follow-up Information     Follow up With Specialties Details Why Contact Info    [facility]  If symptoms worsen [address] [id number] [phone]                      I, Izaiah Stinson,  personally performed the services described in this documentation. All medical record entries made by the scribe were at my direction and in my presence.  I have reviewed the chart and agree that the record reflects my personal performance and  is accurate and complete. Izaiah Stinson M.D. 4:28 PM06/29/2020               Izaiah Stinson MD  06/29/20 1627

## 2021-04-12 ENCOUNTER — HOSPITAL ENCOUNTER (EMERGENCY)
Facility: HOSPITAL | Age: 26
Discharge: HOME | End: 2021-04-12
Attending: EMERGENCY MEDICINE
Payer: MEDICAID

## 2021-04-12 VITALS
SYSTOLIC BLOOD PRESSURE: 121 MMHG | DIASTOLIC BLOOD PRESSURE: 58 MMHG | HEART RATE: 69 BPM | WEIGHT: 120 LBS | OXYGEN SATURATION: 97 % | TEMPERATURE: 99 F | RESPIRATION RATE: 16 BRPM | BODY MASS INDEX: 18.25 KG/M2

## 2021-04-12 DIAGNOSIS — R56.9 SEIZURE-LIKE ACTIVITY: Primary | ICD-10-CM

## 2021-04-12 DIAGNOSIS — F19.10 DRUG ABUSE: ICD-10-CM

## 2021-04-12 DIAGNOSIS — F10.920 ALCOHOLIC INTOXICATION WITHOUT COMPLICATION: ICD-10-CM

## 2021-04-12 LAB
ALBUMIN SERPL BCP-MCNC: 4.2 G/DL (ref 3.5–5.2)
ALP SERPL-CCNC: 66 U/L (ref 55–135)
ALT SERPL W/O P-5'-P-CCNC: 18 U/L (ref 10–44)
AMPHET+METHAMPHET UR QL: NORMAL
ANION GAP SERPL CALC-SCNC: 12 MMOL/L (ref 8–16)
AST SERPL-CCNC: 30 U/L (ref 10–40)
BARBITURATES UR QL SCN>200 NG/ML: NEGATIVE
BASOPHILS # BLD AUTO: 0.03 K/UL (ref 0–0.2)
BASOPHILS NFR BLD: 0.6 % (ref 0–1.9)
BENZODIAZ UR QL SCN>200 NG/ML: NEGATIVE
BILIRUB SERPL-MCNC: 0.4 MG/DL (ref 0.1–1)
BILIRUB UR QL STRIP: NEGATIVE
BUN SERPL-MCNC: 11 MG/DL (ref 6–20)
BZE UR QL SCN: NORMAL
CALCIUM SERPL-MCNC: 8.6 MG/DL (ref 8.7–10.5)
CANNABINOIDS UR QL SCN: NORMAL
CHLORIDE SERPL-SCNC: 106 MMOL/L (ref 95–110)
CLARITY UR: CLEAR
CO2 SERPL-SCNC: 25 MMOL/L (ref 23–29)
COLOR UR: YELLOW
CREAT SERPL-MCNC: 1.1 MG/DL (ref 0.5–1.4)
CREAT UR-MCNC: 154.8 MG/DL (ref 23–375)
DIFFERENTIAL METHOD: ABNORMAL
EOSINOPHIL # BLD AUTO: 0.1 K/UL (ref 0–0.5)
EOSINOPHIL NFR BLD: 1.7 % (ref 0–8)
ERYTHROCYTE [DISTWIDTH] IN BLOOD BY AUTOMATED COUNT: 11.9 % (ref 11.5–14.5)
EST. GFR  (AFRICAN AMERICAN): >60 ML/MIN/1.73 M^2
EST. GFR  (NON AFRICAN AMERICAN): >60 ML/MIN/1.73 M^2
ETHANOL SERPL-MCNC: 235 MG/DL
GLUCOSE SERPL-MCNC: 117 MG/DL (ref 70–110)
GLUCOSE UR QL STRIP: NEGATIVE
HCT VFR BLD AUTO: 47.7 % (ref 40–54)
HGB BLD-MCNC: 16.4 G/DL (ref 14–18)
HGB UR QL STRIP: NEGATIVE
IMM GRANULOCYTES # BLD AUTO: 0.01 K/UL (ref 0–0.04)
IMM GRANULOCYTES NFR BLD AUTO: 0.2 % (ref 0–0.5)
KETONES UR QL STRIP: NEGATIVE
LEUKOCYTE ESTERASE UR QL STRIP: NEGATIVE
LYMPHOCYTES # BLD AUTO: 2.4 K/UL (ref 1–4.8)
LYMPHOCYTES NFR BLD: 44.9 % (ref 18–48)
MAGNESIUM SERPL-MCNC: 2.1 MG/DL (ref 1.6–2.6)
MCH RBC QN AUTO: 32.3 PG (ref 27–31)
MCHC RBC AUTO-ENTMCNC: 34.4 G/DL (ref 32–36)
MCV RBC AUTO: 94 FL (ref 82–98)
METHADONE UR QL SCN>300 NG/ML: NEGATIVE
MONOCYTES # BLD AUTO: 0.4 K/UL (ref 0.3–1)
MONOCYTES NFR BLD: 7.1 % (ref 4–15)
NEUTROPHILS # BLD AUTO: 2.4 K/UL (ref 1.8–7.7)
NEUTROPHILS NFR BLD: 45.5 % (ref 38–73)
NITRITE UR QL STRIP: NEGATIVE
NRBC BLD-RTO: 0 /100 WBC
OPIATES UR QL SCN: NEGATIVE
PCP UR QL SCN>25 NG/ML: NEGATIVE
PH UR STRIP: 6 [PH] (ref 5–8)
PLATELET # BLD AUTO: 283 K/UL (ref 150–450)
PMV BLD AUTO: 9.5 FL (ref 9.2–12.9)
POTASSIUM SERPL-SCNC: 3.6 MMOL/L (ref 3.5–5.1)
PROT SERPL-MCNC: 7.1 G/DL (ref 6–8.4)
PROT UR QL STRIP: NEGATIVE
RBC # BLD AUTO: 5.08 M/UL (ref 4.6–6.2)
SODIUM SERPL-SCNC: 143 MMOL/L (ref 136–145)
SP GR UR STRIP: 1.02 (ref 1–1.03)
TOXICOLOGY INFORMATION: NORMAL
URN SPEC COLLECT METH UR: NORMAL
UROBILINOGEN UR STRIP-ACNC: NEGATIVE EU/DL
WBC # BLD AUTO: 5.32 K/UL (ref 3.9–12.7)

## 2021-04-12 PROCEDURE — 63600175 PHARM REV CODE 636 W HCPCS: Performed by: EMERGENCY MEDICINE

## 2021-04-12 PROCEDURE — 83735 ASSAY OF MAGNESIUM: CPT | Performed by: EMERGENCY MEDICINE

## 2021-04-12 PROCEDURE — 80307 DRUG TEST PRSMV CHEM ANLYZR: CPT | Performed by: EMERGENCY MEDICINE

## 2021-04-12 PROCEDURE — 25000003 PHARM REV CODE 250: Performed by: EMERGENCY MEDICINE

## 2021-04-12 PROCEDURE — 81003 URINALYSIS AUTO W/O SCOPE: CPT | Mod: 59 | Performed by: EMERGENCY MEDICINE

## 2021-04-12 PROCEDURE — 99284 EMERGENCY DEPT VISIT MOD MDM: CPT | Mod: 25

## 2021-04-12 PROCEDURE — 82077 ASSAY SPEC XCP UR&BREATH IA: CPT | Performed by: EMERGENCY MEDICINE

## 2021-04-12 PROCEDURE — 96365 THER/PROPH/DIAG IV INF INIT: CPT

## 2021-04-12 PROCEDURE — 96375 TX/PRO/DX INJ NEW DRUG ADDON: CPT

## 2021-04-12 PROCEDURE — 80053 COMPREHEN METABOLIC PANEL: CPT | Performed by: EMERGENCY MEDICINE

## 2021-04-12 PROCEDURE — 96361 HYDRATE IV INFUSION ADD-ON: CPT

## 2021-04-12 PROCEDURE — 85025 COMPLETE CBC W/AUTO DIFF WBC: CPT | Performed by: EMERGENCY MEDICINE

## 2021-04-12 RX ORDER — LEVETIRACETAM 500 MG/1
500 TABLET ORAL 2 TIMES DAILY
Qty: 60 TABLET | Refills: 11 | Status: SHIPPED | OUTPATIENT
Start: 2021-04-12 | End: 2022-04-12

## 2021-04-12 RX ORDER — LEVETIRACETAM 15 MG/ML
1500 INJECTION INTRAVASCULAR
Status: COMPLETED | OUTPATIENT
Start: 2021-04-12 | End: 2021-04-12

## 2021-04-12 RX ADMIN — SODIUM CHLORIDE 1000 ML: 0.9 INJECTION, SOLUTION INTRAVENOUS at 01:04

## 2021-04-12 RX ADMIN — LEVETIRACETAM INJECTION 1500 MG: 15 INJECTION INTRAVENOUS at 01:04

## 2021-04-12 RX ADMIN — LORAZEPAM 2 MG: 2 INJECTION INTRAMUSCULAR; INTRAVENOUS at 01:04

## 2021-09-27 ENCOUNTER — HOSPITAL ENCOUNTER (EMERGENCY)
Facility: HOSPITAL | Age: 26
Discharge: HOME | End: 2021-09-27
Attending: EMERGENCY MEDICINE
Payer: MEDICAID

## 2021-09-27 VITALS
TEMPERATURE: 99 F | BODY MASS INDEX: 21.29 KG/M2 | HEART RATE: 87 BPM | RESPIRATION RATE: 14 BRPM | OXYGEN SATURATION: 100 % | DIASTOLIC BLOOD PRESSURE: 76 MMHG | SYSTOLIC BLOOD PRESSURE: 135 MMHG | WEIGHT: 140 LBS

## 2021-09-27 DIAGNOSIS — J34.89 RHINORRHEA: Primary | ICD-10-CM

## 2021-09-27 LAB
CTP QC/QA: YES
SARS-COV-2 RDRP RESP QL NAA+PROBE: NEGATIVE

## 2021-09-27 PROCEDURE — 25000242 PHARM REV CODE 250 ALT 637 W/ HCPCS: Performed by: EMERGENCY MEDICINE

## 2021-09-27 PROCEDURE — U0002 COVID-19 LAB TEST NON-CDC: HCPCS | Performed by: EMERGENCY MEDICINE

## 2021-09-27 PROCEDURE — 99283 EMERGENCY DEPT VISIT LOW MDM: CPT | Mod: 25

## 2021-09-27 RX ORDER — FLUTICASONE PROPIONATE 50 MCG
2 SPRAY, SUSPENSION (ML) NASAL DAILY
Status: DISCONTINUED | OUTPATIENT
Start: 2021-09-27 | End: 2021-09-28 | Stop reason: HOSPADM

## 2021-09-27 RX ORDER — OXYMETAZOLINE HCL 0.05 %
2 SPRAY, NON-AEROSOL (ML) NASAL
Status: DISCONTINUED | OUTPATIENT
Start: 2021-09-27 | End: 2021-09-27

## 2021-09-27 RX ADMIN — FLUTICASONE PROPIONATE 100 MCG: 50 SPRAY, METERED NASAL at 10:09

## 2021-12-18 ENCOUNTER — HOSPITAL ENCOUNTER (OUTPATIENT)
Facility: HOSPITAL | Age: 26
Discharge: LEFT BEFORE DISCHARGE | End: 2021-12-19
Attending: EMERGENCY MEDICINE | Admitting: FAMILY MEDICINE
Payer: MEDICAID

## 2021-12-18 DIAGNOSIS — R04.2 COUGH WITH HEMOPTYSIS: ICD-10-CM

## 2021-12-18 DIAGNOSIS — J69.0 ASPIRATION PNEUMONIA OF LEFT LOWER LOBE DUE TO GASTRIC SECRETIONS: Primary | ICD-10-CM

## 2021-12-18 DIAGNOSIS — R41.82 ALTERED MENTAL STATUS: ICD-10-CM

## 2021-12-18 DIAGNOSIS — R07.9 CHEST PAIN: ICD-10-CM

## 2021-12-18 PROBLEM — R56.9 SEIZURES: Status: ACTIVE | Noted: 2021-12-18

## 2021-12-18 PROBLEM — F19.20 DRUG DEPENDENCE: Status: ACTIVE | Noted: 2021-12-18

## 2021-12-18 PROBLEM — F10.20 ALCOHOL DEPENDENCE: Status: ACTIVE | Noted: 2021-12-18

## 2021-12-18 PROBLEM — J81.0 PULMONARY EDEMA, ACUTE: Status: ACTIVE | Noted: 2021-12-18

## 2021-12-18 PROBLEM — G40.909 SEIZURE DISORDER: Status: ACTIVE | Noted: 2021-12-18

## 2021-12-18 LAB
ALBUMIN SERPL BCP-MCNC: 3.9 G/DL (ref 3.5–5.2)
ALLENS TEST: ABNORMAL
ALP SERPL-CCNC: 74 U/L (ref 55–135)
ALT SERPL W/O P-5'-P-CCNC: 47 U/L (ref 10–44)
AMPHET+METHAMPHET UR QL: NEGATIVE
ANION GAP SERPL CALC-SCNC: 15 MMOL/L (ref 8–16)
AST SERPL-CCNC: 57 U/L (ref 10–40)
BACTERIA #/AREA URNS HPF: ABNORMAL /HPF
BARBITURATES UR QL SCN>200 NG/ML: NEGATIVE
BASOPHILS # BLD AUTO: 0.03 K/UL (ref 0–0.2)
BASOPHILS NFR BLD: 0.3 % (ref 0–1.9)
BENZODIAZ UR QL SCN>200 NG/ML: NEGATIVE
BILIRUB SERPL-MCNC: 0.4 MG/DL (ref 0.1–1)
BILIRUB UR QL STRIP: NEGATIVE
BUN SERPL-MCNC: 11 MG/DL (ref 6–20)
BZE UR QL SCN: NEGATIVE
CALCIUM SERPL-MCNC: 8.5 MG/DL (ref 8.7–10.5)
CANNABINOIDS UR QL SCN: ABNORMAL
CHLORIDE SERPL-SCNC: 104 MMOL/L (ref 95–110)
CLARITY UR: CLEAR
CO2 SERPL-SCNC: 19 MMOL/L (ref 23–29)
COLOR UR: YELLOW
CREAT SERPL-MCNC: 1.4 MG/DL (ref 0.5–1.4)
CREAT UR-MCNC: 244.8 MG/DL (ref 23–375)
CTP QC/QA: YES
DELSYS: ABNORMAL
DIFFERENTIAL METHOD: ABNORMAL
EOSINOPHIL # BLD AUTO: 0 K/UL (ref 0–0.5)
EOSINOPHIL NFR BLD: 0.3 % (ref 0–8)
ERYTHROCYTE [DISTWIDTH] IN BLOOD BY AUTOMATED COUNT: 14.6 % (ref 11.5–14.5)
EST. GFR  (AFRICAN AMERICAN): >60 ML/MIN/1.73 M^2
EST. GFR  (NON AFRICAN AMERICAN): >60 ML/MIN/1.73 M^2
ETHANOL SERPL-MCNC: <10 MG/DL
GLUCOSE SERPL-MCNC: 122 MG/DL (ref 70–110)
GLUCOSE UR QL STRIP: ABNORMAL
GRAN CASTS #/AREA URNS LPF: 3 /LPF
HCO3 UR-SCNC: 27.7 MMOL/L (ref 24–28)
HCT VFR BLD AUTO: 49 % (ref 40–54)
HCT VFR BLD CALC: 45 %PCV (ref 36–54)
HGB BLD-MCNC: 15 G/DL
HGB BLD-MCNC: 15.7 G/DL (ref 14–18)
HGB UR QL STRIP: NEGATIVE
HYALINE CASTS #/AREA URNS LPF: 14 /LPF
IMM GRANULOCYTES # BLD AUTO: 0.08 K/UL (ref 0–0.04)
IMM GRANULOCYTES NFR BLD AUTO: 0.8 % (ref 0–0.5)
KETONES UR QL STRIP: NEGATIVE
LEUKOCYTE ESTERASE UR QL STRIP: NEGATIVE
LYMPHOCYTES # BLD AUTO: 1.2 K/UL (ref 1–4.8)
LYMPHOCYTES NFR BLD: 11.4 % (ref 18–48)
MCH RBC QN AUTO: 29.9 PG (ref 27–31)
MCHC RBC AUTO-ENTMCNC: 32 G/DL (ref 32–36)
MCV RBC AUTO: 93 FL (ref 82–98)
METHADONE UR QL SCN>300 NG/ML: NEGATIVE
MICROSCOPIC COMMENT: ABNORMAL
MONOCYTES # BLD AUTO: 0.8 K/UL (ref 0.3–1)
MONOCYTES NFR BLD: 7.4 % (ref 4–15)
NEUTROPHILS # BLD AUTO: 8.2 K/UL (ref 1.8–7.7)
NEUTROPHILS NFR BLD: 79.8 % (ref 38–73)
NITRITE UR QL STRIP: NEGATIVE
NRBC BLD-RTO: 0 /100 WBC
OPIATES UR QL SCN: NEGATIVE
PCO2 BLDA: 53.5 MMHG (ref 35–45)
PCP UR QL SCN>25 NG/ML: NEGATIVE
PH SMN: 7.32 [PH] (ref 7.35–7.45)
PH UR STRIP: 6 [PH] (ref 5–8)
PLATELET # BLD AUTO: 272 K/UL (ref 150–450)
PMV BLD AUTO: 9.2 FL (ref 9.2–12.9)
PO2 BLDA: 64 MMHG (ref 80–100)
POC BE: 2 MMOL/L
POC SATURATED O2: 90 % (ref 95–100)
POC TCO2: 29 MMOL/L (ref 23–27)
POCT GLUCOSE: 181 MG/DL (ref 70–110)
POTASSIUM BLD-SCNC: 4 MMOL/L (ref 3.5–5.1)
POTASSIUM SERPL-SCNC: 5 MMOL/L (ref 3.5–5.1)
PROT SERPL-MCNC: 7.3 G/DL (ref 6–8.4)
PROT UR QL STRIP: ABNORMAL
RBC # BLD AUTO: 5.25 M/UL (ref 4.6–6.2)
RBC #/AREA URNS HPF: 1 /HPF (ref 0–4)
SAMPLE: ABNORMAL
SARS-COV-2 RDRP RESP QL NAA+PROBE: NEGATIVE
SITE: ABNORMAL
SODIUM BLD-SCNC: 140 MMOL/L (ref 136–145)
SODIUM SERPL-SCNC: 138 MMOL/L (ref 136–145)
SP GR UR STRIP: 1.02 (ref 1–1.03)
TOXICOLOGY INFORMATION: ABNORMAL
URN SPEC COLLECT METH UR: ABNORMAL
UROBILINOGEN UR STRIP-ACNC: NEGATIVE EU/DL
WBC # BLD AUTO: 10.23 K/UL (ref 3.9–12.7)
WBC #/AREA URNS HPF: 2 /HPF (ref 0–5)

## 2021-12-18 PROCEDURE — 63600175 PHARM REV CODE 636 W HCPCS: Performed by: NURSE PRACTITIONER

## 2021-12-18 PROCEDURE — 96375 TX/PRO/DX INJ NEW DRUG ADDON: CPT

## 2021-12-18 PROCEDURE — 82962 GLUCOSE BLOOD TEST: CPT

## 2021-12-18 PROCEDURE — G0378 HOSPITAL OBSERVATION PER HR: HCPCS

## 2021-12-18 PROCEDURE — 25000003 PHARM REV CODE 250: Performed by: EMERGENCY MEDICINE

## 2021-12-18 PROCEDURE — 80053 COMPREHEN METABOLIC PANEL: CPT | Performed by: EMERGENCY MEDICINE

## 2021-12-18 PROCEDURE — 96372 THER/PROPH/DIAG INJ SC/IM: CPT | Mod: 59

## 2021-12-18 PROCEDURE — 93010 ELECTROCARDIOGRAM REPORT: CPT | Mod: ,,, | Performed by: INTERNAL MEDICINE

## 2021-12-18 PROCEDURE — 85025 COMPLETE CBC W/AUTO DIFF WBC: CPT | Performed by: EMERGENCY MEDICINE

## 2021-12-18 PROCEDURE — 80307 DRUG TEST PRSMV CHEM ANLYZR: CPT | Performed by: EMERGENCY MEDICINE

## 2021-12-18 PROCEDURE — 96374 THER/PROPH/DIAG INJ IV PUSH: CPT

## 2021-12-18 PROCEDURE — 80177 DRUG SCRN QUAN LEVETIRACETAM: CPT | Performed by: EMERGENCY MEDICINE

## 2021-12-18 PROCEDURE — 81000 URINALYSIS NONAUTO W/SCOPE: CPT | Mod: 59 | Performed by: EMERGENCY MEDICINE

## 2021-12-18 PROCEDURE — U0002 COVID-19 LAB TEST NON-CDC: HCPCS

## 2021-12-18 PROCEDURE — 82077 ASSAY SPEC XCP UR&BREATH IA: CPT | Performed by: EMERGENCY MEDICINE

## 2021-12-18 PROCEDURE — 93010 EKG 12-LEAD: ICD-10-PCS | Mod: ,,, | Performed by: INTERNAL MEDICINE

## 2021-12-18 PROCEDURE — 63600175 PHARM REV CODE 636 W HCPCS

## 2021-12-18 PROCEDURE — 93005 ELECTROCARDIOGRAM TRACING: CPT

## 2021-12-18 PROCEDURE — 63600175 PHARM REV CODE 636 W HCPCS: Performed by: EMERGENCY MEDICINE

## 2021-12-18 PROCEDURE — P9612 CATHETERIZE FOR URINE SPEC: HCPCS

## 2021-12-18 PROCEDURE — 96361 HYDRATE IV INFUSION ADD-ON: CPT

## 2021-12-18 PROCEDURE — 99285 EMERGENCY DEPT VISIT HI MDM: CPT | Mod: 25

## 2021-12-18 RX ORDER — TALC
6 POWDER (GRAM) TOPICAL NIGHTLY PRN
Status: DISCONTINUED | OUTPATIENT
Start: 2021-12-18 | End: 2021-12-19 | Stop reason: HOSPADM

## 2021-12-18 RX ORDER — AMPICILLIN AND SULBACTAM 2; 1 G/1; G/1
3 INJECTION, POWDER, FOR SOLUTION INTRAMUSCULAR; INTRAVENOUS
Status: COMPLETED | OUTPATIENT
Start: 2021-12-18 | End: 2021-12-18

## 2021-12-18 RX ORDER — SODIUM CHLORIDE 0.9 % (FLUSH) 0.9 %
10 SYRINGE (ML) INJECTION EVERY 8 HOURS PRN
Status: DISCONTINUED | OUTPATIENT
Start: 2021-12-18 | End: 2021-12-19 | Stop reason: HOSPADM

## 2021-12-18 RX ORDER — ONDANSETRON 2 MG/ML
4 INJECTION INTRAMUSCULAR; INTRAVENOUS EVERY 8 HOURS PRN
Status: DISCONTINUED | OUTPATIENT
Start: 2021-12-18 | End: 2021-12-19 | Stop reason: HOSPADM

## 2021-12-18 RX ORDER — FUROSEMIDE 10 MG/ML
20 INJECTION INTRAMUSCULAR; INTRAVENOUS ONCE
Status: COMPLETED | OUTPATIENT
Start: 2021-12-18 | End: 2021-12-18

## 2021-12-18 RX ORDER — SIMETHICONE 80 MG
1 TABLET,CHEWABLE ORAL 4 TIMES DAILY PRN
Status: DISCONTINUED | OUTPATIENT
Start: 2021-12-18 | End: 2021-12-19 | Stop reason: HOSPADM

## 2021-12-18 RX ORDER — MAG HYDROX/ALUMINUM HYD/SIMETH 200-200-20
30 SUSPENSION, ORAL (FINAL DOSE FORM) ORAL 4 TIMES DAILY PRN
Status: DISCONTINUED | OUTPATIENT
Start: 2021-12-18 | End: 2021-12-19 | Stop reason: HOSPADM

## 2021-12-18 RX ORDER — PROCHLORPERAZINE EDISYLATE 5 MG/ML
5 INJECTION INTRAMUSCULAR; INTRAVENOUS EVERY 6 HOURS PRN
Status: DISCONTINUED | OUTPATIENT
Start: 2021-12-18 | End: 2021-12-19 | Stop reason: HOSPADM

## 2021-12-18 RX ORDER — IBUPROFEN 200 MG
16 TABLET ORAL
Status: DISCONTINUED | OUTPATIENT
Start: 2021-12-18 | End: 2021-12-19 | Stop reason: HOSPADM

## 2021-12-18 RX ORDER — NALOXONE HCL 0.4 MG/ML
2 VIAL (ML) INJECTION
Status: COMPLETED | OUTPATIENT
Start: 2021-12-18 | End: 2021-12-18

## 2021-12-18 RX ORDER — KETOROLAC TROMETHAMINE 30 MG/ML
30 INJECTION, SOLUTION INTRAMUSCULAR; INTRAVENOUS ONCE
Status: COMPLETED | OUTPATIENT
Start: 2021-12-18 | End: 2021-12-18

## 2021-12-18 RX ORDER — NALOXONE HCL 0.4 MG/ML
0.4 VIAL (ML) INJECTION
Status: DISCONTINUED | OUTPATIENT
Start: 2021-12-18 | End: 2021-12-19 | Stop reason: HOSPADM

## 2021-12-18 RX ORDER — NALOXONE HYDROCHLORIDE 1 MG/ML
INJECTION INTRAMUSCULAR; INTRAVENOUS; SUBCUTANEOUS
Status: COMPLETED
Start: 2021-12-18 | End: 2021-12-18

## 2021-12-18 RX ORDER — ACETAMINOPHEN 325 MG/1
650 TABLET ORAL EVERY 4 HOURS PRN
Status: DISCONTINUED | OUTPATIENT
Start: 2021-12-18 | End: 2021-12-19 | Stop reason: HOSPADM

## 2021-12-18 RX ORDER — LEVETIRACETAM 500 MG/5ML
1000 INJECTION, SOLUTION, CONCENTRATE INTRAVENOUS
Status: COMPLETED | OUTPATIENT
Start: 2021-12-18 | End: 2021-12-18

## 2021-12-18 RX ORDER — IPRATROPIUM BROMIDE AND ALBUTEROL SULFATE 2.5; .5 MG/3ML; MG/3ML
3 SOLUTION RESPIRATORY (INHALATION) EVERY 6 HOURS PRN
Status: DISCONTINUED | OUTPATIENT
Start: 2021-12-18 | End: 2021-12-19 | Stop reason: HOSPADM

## 2021-12-18 RX ORDER — NALOXONE HCL 0.4 MG/ML
2 VIAL (ML) INJECTION
Status: DISCONTINUED | OUTPATIENT
Start: 2021-12-18 | End: 2021-12-18

## 2021-12-18 RX ORDER — IBUPROFEN 200 MG
24 TABLET ORAL
Status: DISCONTINUED | OUTPATIENT
Start: 2021-12-18 | End: 2021-12-19 | Stop reason: HOSPADM

## 2021-12-18 RX ORDER — GLUCAGON 1 MG
1 KIT INJECTION
Status: DISCONTINUED | OUTPATIENT
Start: 2021-12-18 | End: 2021-12-19 | Stop reason: HOSPADM

## 2021-12-18 RX ORDER — LEVETIRACETAM 500 MG/5ML
500 INJECTION, SOLUTION, CONCENTRATE INTRAVENOUS EVERY 12 HOURS
Status: DISCONTINUED | OUTPATIENT
Start: 2021-12-19 | End: 2021-12-19 | Stop reason: HOSPADM

## 2021-12-18 RX ADMIN — FUROSEMIDE 20 MG: 10 INJECTION, SOLUTION INTRAVENOUS at 09:12

## 2021-12-18 RX ADMIN — KETOROLAC TROMETHAMINE 30 MG: 30 INJECTION, SOLUTION INTRAMUSCULAR at 11:12

## 2021-12-18 RX ADMIN — SODIUM CHLORIDE 1000 ML: 0.9 INJECTION, SOLUTION INTRAVENOUS at 05:12

## 2021-12-18 RX ADMIN — NALOXONE HYDROCHLORIDE 2 MG: 1 INJECTION PARENTERAL at 05:12

## 2021-12-18 RX ADMIN — NALXONE HYDROCHLORIDE 2 MG: 0.4 INJECTION INTRAMUSCULAR; INTRAVENOUS; SUBCUTANEOUS at 06:12

## 2021-12-18 RX ADMIN — LEVETIRACETAM 1000 MG: 100 INJECTION, SOLUTION INTRAVENOUS at 07:12

## 2021-12-18 RX ADMIN — AMPICILLIN SODIUM AND SULBACTAM SODIUM 3 G: 2; 1 INJECTION, POWDER, FOR SOLUTION INTRAMUSCULAR; INTRAVENOUS at 08:12

## 2021-12-19 VITALS
TEMPERATURE: 100 F | SYSTOLIC BLOOD PRESSURE: 123 MMHG | OXYGEN SATURATION: 94 % | WEIGHT: 154.31 LBS | HEIGHT: 69 IN | DIASTOLIC BLOOD PRESSURE: 74 MMHG | RESPIRATION RATE: 18 BRPM | BODY MASS INDEX: 22.85 KG/M2 | HEART RATE: 88 BPM

## 2021-12-19 PROCEDURE — G0378 HOSPITAL OBSERVATION PER HR: HCPCS

## 2021-12-22 LAB — LEVETIRACETAM SERPL-MCNC: <1 UG/ML (ref 3–60)

## 2022-03-21 PROBLEM — J81.0 PULMONARY EDEMA, ACUTE: Status: RESOLVED | Noted: 2021-12-18 | Resolved: 2022-03-21
